# Patient Record
Sex: MALE | Race: WHITE | NOT HISPANIC OR LATINO | Employment: OTHER | URBAN - METROPOLITAN AREA
[De-identification: names, ages, dates, MRNs, and addresses within clinical notes are randomized per-mention and may not be internally consistent; named-entity substitution may affect disease eponyms.]

---

## 2018-01-01 ENCOUNTER — APPOINTMENT (INPATIENT)
Dept: RADIOLOGY | Facility: HOSPITAL | Age: 68
DRG: 871 | End: 2018-01-01
Payer: MEDICARE

## 2018-01-01 ENCOUNTER — APPOINTMENT (INPATIENT)
Dept: ULTRASOUND IMAGING | Facility: HOSPITAL | Age: 68
DRG: 871 | End: 2018-01-01
Payer: MEDICARE

## 2018-01-01 ENCOUNTER — HOSPITAL ENCOUNTER (INPATIENT)
Facility: HOSPITAL | Age: 68
LOS: 3 days | DRG: 871 | End: 2018-12-21
Attending: EMERGENCY MEDICINE | Admitting: INTERNAL MEDICINE
Payer: MEDICARE

## 2018-01-01 ENCOUNTER — APPOINTMENT (INPATIENT)
Dept: CT IMAGING | Facility: HOSPITAL | Age: 68
DRG: 871 | End: 2018-01-01
Payer: MEDICARE

## 2018-01-01 ENCOUNTER — APPOINTMENT (EMERGENCY)
Dept: RADIOLOGY | Facility: HOSPITAL | Age: 68
DRG: 871 | End: 2018-01-01
Payer: MEDICARE

## 2018-01-01 ENCOUNTER — APPOINTMENT (INPATIENT)
Dept: NON INVASIVE DIAGNOSTICS | Facility: HOSPITAL | Age: 68
DRG: 871 | End: 2018-01-01
Payer: MEDICARE

## 2018-01-01 VITALS
BODY MASS INDEX: 45.21 KG/M2 | RESPIRATION RATE: 20 BRPM | SYSTOLIC BLOOD PRESSURE: 91 MMHG | OXYGEN SATURATION: 94 % | TEMPERATURE: 99.3 F | HEIGHT: 68 IN | WEIGHT: 298.28 LBS | DIASTOLIC BLOOD PRESSURE: 57 MMHG | HEART RATE: 108 BPM

## 2018-01-01 DIAGNOSIS — K66.8 PNEUMOPERITONEUM: ICD-10-CM

## 2018-01-01 DIAGNOSIS — A41.9 SEVERE SEPSIS (HCC): ICD-10-CM

## 2018-01-01 DIAGNOSIS — I95.9 HYPOTENSION: ICD-10-CM

## 2018-01-01 DIAGNOSIS — E16.2 HYPOGLYCEMIA: ICD-10-CM

## 2018-01-01 DIAGNOSIS — I50.9 ACUTE CONGESTIVE HEART FAILURE, UNSPECIFIED HEART FAILURE TYPE (HCC): ICD-10-CM

## 2018-01-01 DIAGNOSIS — T68.XXXA HYPOTHERMIA, INITIAL ENCOUNTER: ICD-10-CM

## 2018-01-01 DIAGNOSIS — R57.9 SHOCK (HCC): ICD-10-CM

## 2018-01-01 DIAGNOSIS — J90 BILATERAL PLEURAL EFFUSION: ICD-10-CM

## 2018-01-01 DIAGNOSIS — A41.9 SEPTIC SHOCK (HCC): ICD-10-CM

## 2018-01-01 DIAGNOSIS — R65.21 SEPTIC SHOCK (HCC): ICD-10-CM

## 2018-01-01 DIAGNOSIS — L03.119 LOWER EXTREMITY CELLULITIS: ICD-10-CM

## 2018-01-01 DIAGNOSIS — I87.2 VENOUS INSUFFICIENCY OF BOTH LOWER EXTREMITIES: ICD-10-CM

## 2018-01-01 DIAGNOSIS — J96.01 ACUTE RESPIRATORY FAILURE WITH HYPOXIA AND HYPERCAPNIA (HCC): Primary | ICD-10-CM

## 2018-01-01 DIAGNOSIS — R65.20 SEVERE SEPSIS (HCC): ICD-10-CM

## 2018-01-01 DIAGNOSIS — E11.9 DIABETES MELLITUS (HCC): ICD-10-CM

## 2018-01-01 DIAGNOSIS — L89.90 PRESSURE ULCER: ICD-10-CM

## 2018-01-01 DIAGNOSIS — J96.02 ACUTE RESPIRATORY FAILURE WITH HYPOXIA AND HYPERCAPNIA (HCC): Primary | ICD-10-CM

## 2018-01-01 DIAGNOSIS — I48.91 NEW ONSET ATRIAL FIBRILLATION (HCC): ICD-10-CM

## 2018-01-01 LAB
ABO GROUP BLD: NORMAL
ADDITIONAL SETTING: 610
ALBUMIN SERPL BCP-MCNC: 1.9 G/DL (ref 3.5–5)
ALBUMIN SERPL BCP-MCNC: 2.3 G/DL (ref 3.5–5)
ALBUMIN SERPL BCP-MCNC: 2.7 G/DL (ref 3.5–5)
ALP SERPL-CCNC: 160 U/L (ref 46–116)
ALP SERPL-CCNC: 78 U/L (ref 46–116)
ALP SERPL-CCNC: 81 U/L (ref 46–116)
ALT SERPL W P-5'-P-CCNC: 22 U/L (ref 12–78)
ALT SERPL W P-5'-P-CCNC: 26 U/L (ref 12–78)
ALT SERPL W P-5'-P-CCNC: 48 U/L (ref 12–78)
AMORPH URATE CRY URNS QL MICRO: ABNORMAL /HPF
ANCILLARY VALUES: ABNORMAL
ANION GAP BLD CALC-SCNC: 13 MMOL/L (ref 4–13)
ANION GAP SERPL CALCULATED.3IONS-SCNC: 10 MMOL/L (ref 4–13)
ANION GAP SERPL CALCULATED.3IONS-SCNC: 11 MMOL/L (ref 4–13)
ANION GAP SERPL CALCULATED.3IONS-SCNC: 12 MMOL/L (ref 4–13)
ANION GAP SERPL CALCULATED.3IONS-SCNC: 9 MMOL/L (ref 4–13)
APTT PPP: 31 SECONDS (ref 26–38)
APTT PPP: 35 SECONDS (ref 26–38)
ARTERIAL PATENCY WRIST A: ABNORMAL
ARTERIAL PATENCY WRIST A: NO
ARTERIAL PATENCY WRIST A: NO
AST SERPL W P-5'-P-CCNC: 19 U/L (ref 5–45)
AST SERPL W P-5'-P-CCNC: 23 U/L (ref 5–45)
AST SERPL W P-5'-P-CCNC: 60 U/L (ref 5–45)
ATRIAL RATE: 150 BPM
ATRIAL RATE: 326 BPM
ATRIAL RATE: 92 BPM
BACTERIA UR QL AUTO: ABNORMAL /HPF
BASE EX.OXY STD BLDV CALC-SCNC: 73.6 % (ref 60–80)
BASE EX.OXY STD BLDV CALC-SCNC: 80.3 % (ref 60–80)
BASE EX.OXY STD BLDV CALC-SCNC: 81 % (ref 60–80)
BASE EXCESS BLDA CALC-SCNC: -1.8 MMOL/L
BASE EXCESS BLDA CALC-SCNC: -2.2 MMOL/L
BASE EXCESS BLDA CALC-SCNC: -2.7 MMOL/L
BASE EXCESS BLDA CALC-SCNC: -3.2 MMOL/L
BASE EXCESS BLDA CALC-SCNC: -4.5 MMOL/L
BASE EXCESS BLDA CALC-SCNC: -7.2 MMOL/L
BASE EXCESS BLDA CALC-SCNC: 0.6 MMOL/L
BASE EXCESS BLDA CALC-SCNC: 1 MMOL/L (ref -2–3)
BASE EXCESS BLDA CALC-SCNC: 4 MMOL/L (ref -2–3)
BASE EXCESS BLDV CALC-SCNC: -1.5 MMOL/L
BASE EXCESS BLDV CALC-SCNC: -8.2 MMOL/L
BASE EXCESS BLDV CALC-SCNC: 1.5 MMOL/L
BASOPHILS # BLD AUTO: 0.02 THOUSANDS/ΜL (ref 0–0.1)
BASOPHILS # BLD MANUAL: 0 THOUSAND/UL (ref 0–0.1)
BASOPHILS NFR BLD AUTO: 0 % (ref 0–1)
BASOPHILS NFR MAR MANUAL: 0 % (ref 0–1)
BILIRUB DIRECT SERPL-MCNC: 0.55 MG/DL (ref 0–0.2)
BILIRUB SERPL-MCNC: 0.8 MG/DL (ref 0.2–1)
BILIRUB SERPL-MCNC: 1.6 MG/DL (ref 0.2–1)
BILIRUB SERPL-MCNC: 2.5 MG/DL (ref 0.2–1)
BILIRUB UR QL STRIP: ABNORMAL
BLD GP AB SCN SERPL QL: NEGATIVE
BODY TEMPERATURE: 98.4 DEGREES FEHRENHEIT
BODY TEMPERATURE: 98.4 DEGREES FEHRENHEIT
BUN BLD-MCNC: 67 MG/DL (ref 5–25)
BUN SERPL-MCNC: 76 MG/DL (ref 5–25)
BUN SERPL-MCNC: 77 MG/DL (ref 5–25)
BUN SERPL-MCNC: 84 MG/DL (ref 5–25)
BUN SERPL-MCNC: 85 MG/DL (ref 5–25)
BUN SERPL-MCNC: 86 MG/DL (ref 5–25)
BUN SERPL-MCNC: 87 MG/DL (ref 5–25)
BUN SERPL-MCNC: 90 MG/DL (ref 5–25)
CA-I BLD-SCNC: 1.06 MMOL/L (ref 1.12–1.32)
CA-I BLD-SCNC: 1.07 MMOL/L (ref 1.12–1.32)
CA-I BLD-SCNC: 1.16 MMOL/L (ref 1.12–1.32)
CA-I BLD-SCNC: 1.22 MMOL/L (ref 1.12–1.32)
CALCIUM SERPL-MCNC: 8.4 MG/DL (ref 8.3–10.1)
CALCIUM SERPL-MCNC: 8.5 MG/DL (ref 8.3–10.1)
CALCIUM SERPL-MCNC: 8.5 MG/DL (ref 8.3–10.1)
CALCIUM SERPL-MCNC: 8.7 MG/DL (ref 8.3–10.1)
CALCIUM SERPL-MCNC: 8.9 MG/DL (ref 8.3–10.1)
CALCIUM SERPL-MCNC: 9 MG/DL (ref 8.3–10.1)
CALCIUM SERPL-MCNC: 9.2 MG/DL (ref 8.3–10.1)
CHLORIDE BLD-SCNC: 97 MMOL/L (ref 100–108)
CHLORIDE SERPL-SCNC: 100 MMOL/L (ref 100–108)
CHLORIDE SERPL-SCNC: 102 MMOL/L (ref 100–108)
CHLORIDE SERPL-SCNC: 104 MMOL/L (ref 100–108)
CHLORIDE SERPL-SCNC: 109 MMOL/L (ref 100–108)
CHLORIDE SERPL-SCNC: 110 MMOL/L (ref 100–108)
CLARITY UR: ABNORMAL
CO2 SERPL-SCNC: 22 MMOL/L (ref 21–32)
CO2 SERPL-SCNC: 22 MMOL/L (ref 21–32)
CO2 SERPL-SCNC: 24 MMOL/L (ref 21–32)
CO2 SERPL-SCNC: 25 MMOL/L (ref 21–32)
CO2 SERPL-SCNC: 26 MMOL/L (ref 21–32)
CO2 SERPL-SCNC: 26 MMOL/L (ref 21–32)
CO2 SERPL-SCNC: 30 MMOL/L (ref 21–32)
COLOR UR: YELLOW
CORTIS SERPL-MCNC: 51 UG/DL
CREAT BLD-MCNC: 1.2 MG/DL (ref 0.6–1.3)
CREAT SERPL-MCNC: 1.11 MG/DL (ref 0.6–1.3)
CREAT SERPL-MCNC: 1.2 MG/DL (ref 0.6–1.3)
CREAT SERPL-MCNC: 1.22 MG/DL (ref 0.6–1.3)
CREAT SERPL-MCNC: 1.26 MG/DL (ref 0.6–1.3)
CREAT SERPL-MCNC: 1.51 MG/DL (ref 0.6–1.3)
CREAT SERPL-MCNC: 1.57 MG/DL (ref 0.6–1.3)
CREAT SERPL-MCNC: 1.75 MG/DL (ref 0.6–1.3)
DS:DELIVERY SYSTEM: ABNORMAL
EOSINOPHIL # BLD AUTO: 0.06 THOUSAND/ΜL (ref 0–0.61)
EOSINOPHIL # BLD MANUAL: 0 THOUSAND/UL (ref 0–0.4)
EOSINOPHIL NFR BLD AUTO: 1 % (ref 0–6)
EOSINOPHIL NFR BLD MANUAL: 0 % (ref 0–6)
ERYTHROCYTE [DISTWIDTH] IN BLOOD BY AUTOMATED COUNT: 18.1 % (ref 11.6–15.1)
ERYTHROCYTE [DISTWIDTH] IN BLOOD BY AUTOMATED COUNT: 18.4 % (ref 11.6–15.1)
ERYTHROCYTE [DISTWIDTH] IN BLOOD BY AUTOMATED COUNT: 18.5 % (ref 11.6–15.1)
ERYTHROCYTE [DISTWIDTH] IN BLOOD BY AUTOMATED COUNT: 18.9 % (ref 11.6–15.1)
ERYTHROCYTE [DISTWIDTH] IN BLOOD BY AUTOMATED COUNT: 19.1 % (ref 11.6–15.1)
ERYTHROCYTE [DISTWIDTH] IN BLOOD BY AUTOMATED COUNT: 19.2 % (ref 11.6–15.1)
EST. AVERAGE GLUCOSE BLD GHB EST-MCNC: 103 MG/DL
FIO2 GAS DIL.REBREATH: 100 L
FLUAV AG SPEC QL IA: NEGATIVE
FLUAV AG SPEC QL: NORMAL
FLUBV AG SPEC QL IA: NEGATIVE
FLUBV AG SPEC QL: NORMAL
GFR SERPL CREATININE-BSD FRML MDRD: 39 ML/MIN/1.73SQ M
GFR SERPL CREATININE-BSD FRML MDRD: 45 ML/MIN/1.73SQ M
GFR SERPL CREATININE-BSD FRML MDRD: 47 ML/MIN/1.73SQ M
GFR SERPL CREATININE-BSD FRML MDRD: 59 ML/MIN/1.73SQ M
GFR SERPL CREATININE-BSD FRML MDRD: 61 ML/MIN/1.73SQ M
GFR SERPL CREATININE-BSD FRML MDRD: 62 ML/MIN/1.73SQ M
GFR SERPL CREATININE-BSD FRML MDRD: 62 ML/MIN/1.73SQ M
GFR SERPL CREATININE-BSD FRML MDRD: 68 ML/MIN/1.73SQ M
GLUCOSE SERPL-MCNC: 101 MG/DL (ref 65–140)
GLUCOSE SERPL-MCNC: 102 MG/DL (ref 65–140)
GLUCOSE SERPL-MCNC: 106 MG/DL (ref 65–140)
GLUCOSE SERPL-MCNC: 106 MG/DL (ref 65–140)
GLUCOSE SERPL-MCNC: 107 MG/DL (ref 65–140)
GLUCOSE SERPL-MCNC: 111 MG/DL (ref 65–140)
GLUCOSE SERPL-MCNC: 113 MG/DL (ref 65–140)
GLUCOSE SERPL-MCNC: 113 MG/DL (ref 65–140)
GLUCOSE SERPL-MCNC: 116 MG/DL (ref 65–140)
GLUCOSE SERPL-MCNC: 127 MG/DL (ref 65–140)
GLUCOSE SERPL-MCNC: 128 MG/DL (ref 65–140)
GLUCOSE SERPL-MCNC: 133 MG/DL (ref 65–140)
GLUCOSE SERPL-MCNC: 146 MG/DL (ref 65–140)
GLUCOSE SERPL-MCNC: 154 MG/DL (ref 65–140)
GLUCOSE SERPL-MCNC: 170 MG/DL (ref 65–140)
GLUCOSE SERPL-MCNC: 171 MG/DL (ref 65–140)
GLUCOSE SERPL-MCNC: 19 MG/DL (ref 65–140)
GLUCOSE SERPL-MCNC: 199 MG/DL (ref 65–140)
GLUCOSE SERPL-MCNC: 21 MG/DL (ref 65–140)
GLUCOSE SERPL-MCNC: 48 MG/DL (ref 65–140)
GLUCOSE SERPL-MCNC: 68 MG/DL (ref 65–140)
GLUCOSE SERPL-MCNC: 77 MG/DL (ref 65–140)
GLUCOSE SERPL-MCNC: 80 MG/DL (ref 65–140)
GLUCOSE SERPL-MCNC: 80 MG/DL (ref 65–140)
GLUCOSE SERPL-MCNC: 86 MG/DL (ref 65–140)
GLUCOSE SERPL-MCNC: 87 MG/DL (ref 65–140)
GLUCOSE SERPL-MCNC: 90 MG/DL (ref 65–140)
GLUCOSE SERPL-MCNC: 94 MG/DL (ref 65–140)
GLUCOSE SERPL-MCNC: 94 MG/DL (ref 65–140)
GLUCOSE SERPL-MCNC: 95 MG/DL (ref 65–140)
GLUCOSE SERPL-MCNC: 98 MG/DL (ref 65–140)
GLUCOSE SERPL-MCNC: 98 MG/DL (ref 65–140)
GLUCOSE SERPL-MCNC: <20 MG/DL (ref 65–140)
GLUCOSE SERPL-MCNC: <20 MG/DL (ref 65–140)
GLUCOSE UR STRIP-MCNC: NEGATIVE MG/DL
HBA1C MFR BLD: 5.2 % (ref 4.2–6.3)
HCO3 BLDA-SCNC: 16.4 MMOL/L (ref 22–28)
HCO3 BLDA-SCNC: 20.1 MMOL/L (ref 22–28)
HCO3 BLDA-SCNC: 22.1 MMOL/L (ref 22–28)
HCO3 BLDA-SCNC: 24.6 MMOL/L (ref 22–28)
HCO3 BLDA-SCNC: 25.1 MMOL/L (ref 22–28)
HCO3 BLDA-SCNC: 26 MMOL/L (ref 22–28)
HCO3 BLDA-SCNC: 27.8 MMOL/L (ref 22–28)
HCO3 BLDA-SCNC: 29.2 MMOL/L (ref 22–28)
HCO3 BLDA-SCNC: 33.2 MMOL/L (ref 24–30)
HCO3 BLDV-SCNC: 15.9 MMOL/L (ref 24–30)
HCO3 BLDV-SCNC: 24.8 MMOL/L (ref 24–30)
HCO3 BLDV-SCNC: 27.5 MMOL/L (ref 24–30)
HCT VFR BLD AUTO: 45.8 % (ref 36.5–49.3)
HCT VFR BLD AUTO: 46.5 % (ref 36.5–49.3)
HCT VFR BLD AUTO: 47 % (ref 36.5–49.3)
HCT VFR BLD AUTO: 48.1 % (ref 36.5–49.3)
HCT VFR BLD AUTO: 50.4 % (ref 36.5–49.3)
HCT VFR BLD AUTO: 51.7 % (ref 36.5–49.3)
HCT VFR BLD CALC: 51 % (ref 36.5–49.3)
HCT VFR BLD CALC: 54 % (ref 36.5–49.3)
HCT VFR BLD CALC: 54 % (ref 36.5–49.3)
HGB BLD-MCNC: 15.9 G/DL (ref 12–17)
HGB BLD-MCNC: 16.4 G/DL (ref 12–17)
HGB BLD-MCNC: 16.4 G/DL (ref 12–17)
HGB BLD-MCNC: 17.3 G/DL (ref 12–17)
HGB BLD-MCNC: 18.1 G/DL (ref 12–17)
HGB BLD-MCNC: 18.3 G/DL (ref 12–17)
HGB BLDA-MCNC: 17.3 G/DL (ref 12–17)
HGB BLDA-MCNC: 18.4 G/DL (ref 12–17)
HGB BLDA-MCNC: 18.4 G/DL (ref 12–17)
HGB UR QL STRIP.AUTO: ABNORMAL
HOROWITZ INDEX BLDA+IHG-RTO: 50 MM[HG]
HYALINE CASTS #/AREA URNS LPF: ABNORMAL /LPF
IMM GRANULOCYTES # BLD AUTO: 0.01 THOUSAND/UL (ref 0–0.2)
IMM GRANULOCYTES NFR BLD AUTO: 0 % (ref 0–2)
INR PPP: 1.16 (ref 0.86–1.17)
INR PPP: 1.64 (ref 0.86–1.17)
IPAP: 14
IPAP: 14
KETONES UR STRIP-MCNC: ABNORMAL MG/DL
L PNEUMO1 AG UR QL IA.RAPID: NEGATIVE
LACTATE SERPL-SCNC: 1.2 MMOL/L (ref 0.5–2)
LACTATE SERPL-SCNC: 1.5 MMOL/L (ref 0.5–2)
LACTATE SERPL-SCNC: 1.7 MMOL/L (ref 0.5–2)
LEUKOCYTE ESTERASE UR QL STRIP: NEGATIVE
LIPASE SERPL-CCNC: 270 U/L (ref 73–393)
LIPASE SERPL-CCNC: 494 U/L (ref 73–393)
LYMPHOCYTES # BLD AUTO: 0.17 THOUSAND/UL (ref 0.6–4.47)
LYMPHOCYTES # BLD AUTO: 0.2 THOUSAND/UL (ref 0.6–4.47)
LYMPHOCYTES # BLD AUTO: 0.31 THOUSAND/UL (ref 0.6–4.47)
LYMPHOCYTES # BLD AUTO: 0.35 THOUSAND/UL (ref 0.6–4.47)
LYMPHOCYTES # BLD AUTO: 0.52 THOUSANDS/ΜL (ref 0.6–4.47)
LYMPHOCYTES # BLD AUTO: 2 % (ref 14–44)
LYMPHOCYTES # BLD AUTO: 2 % (ref 14–44)
LYMPHOCYTES # BLD AUTO: 3 % (ref 14–44)
LYMPHOCYTES # BLD AUTO: 3 % (ref 14–44)
LYMPHOCYTES NFR BLD AUTO: 11 % (ref 14–44)
MAGNESIUM SERPL-MCNC: 2.2 MG/DL (ref 1.6–2.6)
MAGNESIUM SERPL-MCNC: 2.3 MG/DL (ref 1.6–2.6)
MAGNESIUM SERPL-MCNC: 2.3 MG/DL (ref 1.6–2.6)
MAGNESIUM SERPL-MCNC: 2.5 MG/DL (ref 1.6–2.6)
MCH RBC QN AUTO: 31.8 PG (ref 26.8–34.3)
MCH RBC QN AUTO: 32 PG (ref 26.8–34.3)
MCH RBC QN AUTO: 32.1 PG (ref 26.8–34.3)
MCH RBC QN AUTO: 32.2 PG (ref 26.8–34.3)
MCH RBC QN AUTO: 32.5 PG (ref 26.8–34.3)
MCH RBC QN AUTO: 32.8 PG (ref 26.8–34.3)
MCHC RBC AUTO-ENTMCNC: 34.7 G/DL (ref 31.4–37.4)
MCHC RBC AUTO-ENTMCNC: 34.9 G/DL (ref 31.4–37.4)
MCHC RBC AUTO-ENTMCNC: 35.3 G/DL (ref 31.4–37.4)
MCHC RBC AUTO-ENTMCNC: 35.4 G/DL (ref 31.4–37.4)
MCHC RBC AUTO-ENTMCNC: 35.9 G/DL (ref 31.4–37.4)
MCHC RBC AUTO-ENTMCNC: 36 G/DL (ref 31.4–37.4)
MCV RBC AUTO: 90 FL (ref 82–98)
MCV RBC AUTO: 91 FL (ref 82–98)
MCV RBC AUTO: 92 FL (ref 82–98)
MCV RBC AUTO: 93 FL (ref 82–98)
MONOCYTES # BLD AUTO: 0.21 THOUSAND/UL (ref 0–1.22)
MONOCYTES # BLD AUTO: 0.25 THOUSAND/UL (ref 0–1.22)
MONOCYTES # BLD AUTO: 0.3 THOUSAND/UL (ref 0–1.22)
MONOCYTES # BLD AUTO: 0.31 THOUSAND/ΜL (ref 0.17–1.22)
MONOCYTES # BLD AUTO: 0.47 THOUSAND/UL (ref 0–1.22)
MONOCYTES NFR BLD AUTO: 7 % (ref 4–12)
MONOCYTES NFR BLD: 2 % (ref 4–12)
MONOCYTES NFR BLD: 3 % (ref 4–12)
MONOCYTES NFR BLD: 3 % (ref 4–12)
MONOCYTES NFR BLD: 4 % (ref 4–12)
MRSA NOSE QL CULT: NORMAL
NEUTROPHILS # BLD AUTO: 3.76 THOUSANDS/ΜL (ref 1.85–7.62)
NEUTROPHILS # BLD MANUAL: 10.82 THOUSAND/UL (ref 1.85–7.62)
NEUTROPHILS # BLD MANUAL: 7.99 THOUSAND/UL (ref 1.85–7.62)
NEUTROPHILS # BLD MANUAL: 9.47 THOUSAND/UL (ref 1.85–7.62)
NEUTROPHILS # BLD MANUAL: 9.79 THOUSAND/UL (ref 1.85–7.62)
NEUTS BAND NFR BLD MANUAL: 10 % (ref 0–8)
NEUTS BAND NFR BLD MANUAL: 11 % (ref 0–8)
NEUTS BAND NFR BLD MANUAL: 8 % (ref 0–8)
NEUTS BAND NFR BLD MANUAL: 8 % (ref 0–8)
NEUTS SEG NFR BLD AUTO: 81 % (ref 43–75)
NEUTS SEG NFR BLD AUTO: 82 % (ref 43–75)
NEUTS SEG NFR BLD AUTO: 85 % (ref 43–75)
NEUTS SEG NFR BLD AUTO: 87 % (ref 43–75)
NEUTS SEG NFR BLD AUTO: 87 % (ref 43–75)
NITRITE UR QL STRIP: NEGATIVE
NON VENT- BIPAP: ABNORMAL
NON VENT- BIPAP: ABNORMAL
NON-SQ EPI CELLS URNS QL MICRO: ABNORMAL /HPF
NRBC BLD AUTO-RTO: 0 /100 WBCS
NRBC BLD AUTO-RTO: 1 /100 WBC (ref 0–2)
NT-PROBNP SERPL-MCNC: 8391 PG/ML
O2 CT BLDA-SCNC: 16.7 ML/DL (ref 16–23)
O2 CT BLDA-SCNC: 20.1 ML/DL (ref 16–23)
O2 CT BLDA-SCNC: 20.5 ML/DL (ref 16–23)
O2 CT BLDA-SCNC: 20.6 ML/DL (ref 16–23)
O2 CT BLDA-SCNC: 21 ML/DL (ref 16–23)
O2 CT BLDA-SCNC: 21.4 ML/DL (ref 16–23)
O2 CT BLDA-SCNC: 22.1 ML/DL (ref 16–23)
O2 CT BLDV-SCNC: 17.5 ML/DL
O2 CT BLDV-SCNC: 17.7 ML/DL
O2 CT BLDV-SCNC: 18.3 ML/DL
OXYHGB MFR BLDA: 89 % (ref 94–97)
OXYHGB MFR BLDA: 89.5 % (ref 94–97)
OXYHGB MFR BLDA: 89.9 % (ref 94–97)
OXYHGB MFR BLDA: 90 % (ref 94–97)
OXYHGB MFR BLDA: 90.1 % (ref 94–97)
OXYHGB MFR BLDA: 91.4 % (ref 94–97)
OXYHGB MFR BLDA: 92 % (ref 94–97)
P AXIS: 249 DEGREES
PCO2 BLD: 31 MMOL/L (ref 21–32)
PCO2 BLD: 32 MMOL/L (ref 21–32)
PCO2 BLD: 35 MMOL/L (ref 21–32)
PCO2 BLD: 57.2 MM HG (ref 36–44)
PCO2 BLD: 66.3 MM HG (ref 42–50)
PCO2 BLDA: 28.3 MM HG (ref 36–44)
PCO2 BLDA: 32.2 MM HG (ref 36–44)
PCO2 BLDA: 46.3 MM HG (ref 36–44)
PCO2 BLDA: 49.9 MM HG (ref 36–44)
PCO2 BLDA: 50.6 MM HG (ref 36–44)
PCO2 BLDA: 54 MM HG (ref 36–44)
PCO2 BLDA: 60.6 MM HG (ref 36–44)
PCO2 BLDV: 29.7 MM HG (ref 42–50)
PCO2 BLDV: 47.2 MM HG (ref 42–50)
PCO2 BLDV: 47.8 MM HG (ref 42–50)
PEEP MAX SETTING VENT: 8 CM[H2O]
PEEP MAX SETTING VENT: 8 CM[H2O]
PEEP RESPIRATORY: 8 CM[H2O]
PH BLD: 7.31 [PH] (ref 7.3–7.4)
PH BLD: 7.32 [PH] (ref 7.35–7.45)
PH BLDA: 7.25 [PH] (ref 7.35–7.45)
PH BLDA: 7.3 [PH] (ref 7.35–7.45)
PH BLDA: 7.31 [PH] (ref 7.35–7.45)
PH BLDA: 7.31 [PH] (ref 7.35–7.45)
PH BLDA: 7.33 [PH] (ref 7.35–7.45)
PH BLDA: 7.38 [PH] (ref 7.35–7.45)
PH BLDA: 7.41 [PH] (ref 7.35–7.45)
PH BLDV: 7.34 [PH] (ref 7.3–7.4)
PH BLDV: 7.35 [PH] (ref 7.3–7.4)
PH BLDV: 7.38 [PH] (ref 7.3–7.4)
PH UR STRIP.AUTO: 5 [PH] (ref 4.5–8)
PHOSPHATE SERPL-MCNC: 3.7 MG/DL (ref 2.3–4.1)
PHOSPHATE SERPL-MCNC: 4.5 MG/DL (ref 2.3–4.1)
PHOSPHATE SERPL-MCNC: 4.7 MG/DL (ref 2.3–4.1)
PLATELET # BLD AUTO: 186 THOUSANDS/UL (ref 149–390)
PLATELET # BLD AUTO: 187 THOUSANDS/UL (ref 149–390)
PLATELET # BLD AUTO: 192 THOUSANDS/UL (ref 149–390)
PLATELET # BLD AUTO: 211 THOUSANDS/UL (ref 149–390)
PLATELET # BLD AUTO: 222 THOUSANDS/UL (ref 149–390)
PLATELET # BLD AUTO: 290 THOUSANDS/UL (ref 149–390)
PLATELET BLD QL SMEAR: ADEQUATE
PMV BLD AUTO: 10.3 FL (ref 8.9–12.7)
PMV BLD AUTO: 10.5 FL (ref 8.9–12.7)
PMV BLD AUTO: 10.6 FL (ref 8.9–12.7)
PMV BLD AUTO: 10.7 FL (ref 8.9–12.7)
PMV BLD AUTO: 10.8 FL (ref 8.9–12.7)
PMV BLD AUTO: 12.4 FL (ref 8.9–12.7)
PO2 BLD: 39 MM HG (ref 35–45)
PO2 BLD: 62 MM HG (ref 75–129)
PO2 BLDA: 61.8 MM HG (ref 75–129)
PO2 BLDA: 63.3 MM HG (ref 75–129)
PO2 BLDA: 64.5 MM HG (ref 75–129)
PO2 BLDA: 64.9 MM HG (ref 75–129)
PO2 BLDA: 65.8 MM HG (ref 75–129)
PO2 BLDA: 67.2 MM HG (ref 75–129)
PO2 BLDA: 70.1 MM HG (ref 75–129)
PO2 BLDV: 41.4 MM HG (ref 35–45)
PO2 BLDV: 45.8 MM HG (ref 35–45)
PO2 BLDV: 48.9 MM HG (ref 35–45)
POTASSIUM BLD-SCNC: 3.9 MMOL/L (ref 3.5–5.3)
POTASSIUM BLD-SCNC: 4 MMOL/L (ref 3.5–5.3)
POTASSIUM BLD-SCNC: 5.1 MMOL/L (ref 3.5–5.3)
POTASSIUM SERPL-SCNC: 4 MMOL/L (ref 3.5–5.3)
POTASSIUM SERPL-SCNC: 4.3 MMOL/L (ref 3.5–5.3)
POTASSIUM SERPL-SCNC: 4.4 MMOL/L (ref 3.5–5.3)
POTASSIUM SERPL-SCNC: 4.4 MMOL/L (ref 3.5–5.3)
POTASSIUM SERPL-SCNC: 4.7 MMOL/L (ref 3.5–5.3)
POTASSIUM SERPL-SCNC: 4.7 MMOL/L (ref 3.5–5.3)
POTASSIUM SERPL-SCNC: 5.1 MMOL/L (ref 3.5–5.3)
PR INTERVAL: 154 MS
PRESSURE SETTING: 1
PROCALCITONIN SERPL-MCNC: 0.5 NG/ML
PROCALCITONIN SERPL-MCNC: 1.85 NG/ML
PROCALCITONIN SERPL-MCNC: 3.61 NG/ML
PROCALCITONIN SERPL-MCNC: 4.33 NG/ML
PROT SERPL-MCNC: 5.6 G/DL (ref 6.4–8.2)
PROT SERPL-MCNC: 5.9 G/DL (ref 6.4–8.2)
PROT SERPL-MCNC: 6.3 G/DL (ref 6.4–8.2)
PROT UR STRIP-MCNC: ABNORMAL MG/DL
PROTHROMBIN TIME: 14.5 SECONDS (ref 11.8–14.2)
PROTHROMBIN TIME: 18.9 SECONDS (ref 11.8–14.2)
QRS AXIS: 0 DEGREES
QRS AXIS: 221 DEGREES
QRS AXIS: 246 DEGREES
QRSD INTERVAL: 112 MS
QRSD INTERVAL: 112 MS
QRSD INTERVAL: 4 MS
QT INTERVAL: 254 MS
QT INTERVAL: 380 MS
QT INTERVAL: 418 MS
QTC INTERVAL: 314 MS
QTC INTERVAL: 407 MS
QTC INTERVAL: 457 MS
RBC # BLD AUTO: 4.94 MILLION/UL (ref 3.88–5.62)
RBC # BLD AUTO: 5.12 MILLION/UL (ref 3.88–5.62)
RBC # BLD AUTO: 5.15 MILLION/UL (ref 3.88–5.62)
RBC # BLD AUTO: 5.28 MILLION/UL (ref 3.88–5.62)
RBC # BLD AUTO: 5.57 MILLION/UL (ref 3.88–5.62)
RBC # BLD AUTO: 5.7 MILLION/UL (ref 3.88–5.62)
RBC #/AREA URNS AUTO: ABNORMAL /HPF
RESPIRATORY RATE: 8
RH BLD: POSITIVE
RSV B RNA SPEC QL NAA+PROBE: NORMAL
S PNEUM AG UR QL: NEGATIVE
SAMPLE SITE: ABNORMAL
SAO2 % BLD FROM PO2: 66 % (ref 95–98)
SAO2 % BLD FROM PO2: 88 % (ref 95–98)
SODIUM BLD-SCNC: 138 MMOL/L (ref 136–145)
SODIUM BLD-SCNC: 139 MMOL/L (ref 136–145)
SODIUM BLD-SCNC: 140 MMOL/L (ref 136–145)
SODIUM SERPL-SCNC: 138 MMOL/L (ref 136–145)
SODIUM SERPL-SCNC: 139 MMOL/L (ref 136–145)
SODIUM SERPL-SCNC: 140 MMOL/L (ref 136–145)
SODIUM SERPL-SCNC: 140 MMOL/L (ref 136–145)
SODIUM SERPL-SCNC: 141 MMOL/L (ref 136–145)
SODIUM SERPL-SCNC: 142 MMOL/L (ref 136–145)
SODIUM SERPL-SCNC: 143 MMOL/L (ref 136–145)
SP GR UR STRIP.AUTO: >=1.03 (ref 1–1.03)
SPECIMEN EXPIRATION DATE: NORMAL
SPECIMEN SOURCE: ABNORMAL
T WAVE AXIS: 118 DEGREES
T WAVE AXIS: 170 DEGREES
T WAVE AXIS: 260 DEGREES
TARGETS BLD QL SMEAR: PRESENT
TOTAL CELLS COUNTED SPEC: 100
TROPONIN I SERPL-MCNC: 0.04 NG/ML
TROPONIN I SERPL-MCNC: 0.05 NG/ML
TSH SERPL DL<=0.05 MIU/L-ACNC: 0.87 UIU/ML (ref 0.36–3.74)
TSH SERPL DL<=0.05 MIU/L-ACNC: 3.91 UIU/ML (ref 0.36–3.74)
UROBILINOGEN UR QL STRIP.AUTO: 0.2 E.U./DL
VANCOMYCIN TROUGH SERPL-MCNC: 21 UG/ML (ref 10–20)
VENT AC: 22
VENT BIPAP FIO2: 60 %
VENT BIPAP FIO2: 80 %
VENT- AC: AC
VENTILATION VALUE: 0
VENTRICULAR RATE: 69 BPM
VENTRICULAR RATE: 72 BPM
VENTRICULAR RATE: 92 BPM
VT SETTING VENT: 450 ML
WBC # BLD AUTO: 10.3 THOUSAND/UL (ref 4.31–10.16)
WBC # BLD AUTO: 10.34 THOUSAND/UL (ref 4.31–10.16)
WBC # BLD AUTO: 11.63 THOUSAND/UL (ref 4.31–10.16)
WBC # BLD AUTO: 4.68 THOUSAND/UL (ref 4.31–10.16)
WBC # BLD AUTO: 8.41 THOUSAND/UL (ref 4.31–10.16)
WBC # BLD AUTO: 9.97 THOUSAND/UL (ref 4.31–10.16)
WBC #/AREA URNS AUTO: ABNORMAL /HPF

## 2018-01-01 PROCEDURE — 71045 X-RAY EXAM CHEST 1 VIEW: CPT

## 2018-01-01 PROCEDURE — 83735 ASSAY OF MAGNESIUM: CPT | Performed by: NURSE PRACTITIONER

## 2018-01-01 PROCEDURE — 82948 REAGENT STRIP/BLOOD GLUCOSE: CPT

## 2018-01-01 PROCEDURE — 96374 THER/PROPH/DIAG INJ IV PUSH: CPT

## 2018-01-01 PROCEDURE — 84484 ASSAY OF TROPONIN QUANT: CPT | Performed by: EMERGENCY MEDICINE

## 2018-01-01 PROCEDURE — 84100 ASSAY OF PHOSPHORUS: CPT | Performed by: PHYSICIAN ASSISTANT

## 2018-01-01 PROCEDURE — 0BC58ZZ EXTIRPATION OF MATTER FROM RIGHT MIDDLE LOBE BRONCHUS, VIA NATURAL OR ARTIFICIAL OPENING ENDOSCOPIC: ICD-10-PCS | Performed by: INTERNAL MEDICINE

## 2018-01-01 PROCEDURE — 82533 TOTAL CORTISOL: CPT | Performed by: PHYSICIAN ASSISTANT

## 2018-01-01 PROCEDURE — 88112 CYTOPATH CELL ENHANCE TECH: CPT | Performed by: PATHOLOGY

## 2018-01-01 PROCEDURE — 99291 CRITICAL CARE FIRST HOUR: CPT | Performed by: INTERNAL MEDICINE

## 2018-01-01 PROCEDURE — 94660 CPAP INITIATION&MGMT: CPT

## 2018-01-01 PROCEDURE — 94760 N-INVAS EAR/PLS OXIMETRY 1: CPT

## 2018-01-01 PROCEDURE — 80202 ASSAY OF VANCOMYCIN: CPT | Performed by: NURSE PRACTITIONER

## 2018-01-01 PROCEDURE — 36556 INSERT NON-TUNNEL CV CATH: CPT | Performed by: INTERNAL MEDICINE

## 2018-01-01 PROCEDURE — 83605 ASSAY OF LACTIC ACID: CPT | Performed by: NURSE PRACTITIONER

## 2018-01-01 PROCEDURE — 87631 RESP VIRUS 3-5 TARGETS: CPT | Performed by: NURSE PRACTITIONER

## 2018-01-01 PROCEDURE — 80048 BASIC METABOLIC PNL TOTAL CA: CPT | Performed by: NURSE PRACTITIONER

## 2018-01-01 PROCEDURE — 36600 WITHDRAWAL OF ARTERIAL BLOOD: CPT

## 2018-01-01 PROCEDURE — 85027 COMPLETE CBC AUTOMATED: CPT | Performed by: PHYSICIAN ASSISTANT

## 2018-01-01 PROCEDURE — 99231 SBSQ HOSP IP/OBS SF/LOW 25: CPT | Performed by: INTERNAL MEDICINE

## 2018-01-01 PROCEDURE — 94003 VENT MGMT INPAT SUBQ DAY: CPT

## 2018-01-01 PROCEDURE — 84100 ASSAY OF PHOSPHORUS: CPT | Performed by: NURSE PRACTITIONER

## 2018-01-01 PROCEDURE — 99233 SBSQ HOSP IP/OBS HIGH 50: CPT | Performed by: INTERNAL MEDICINE

## 2018-01-01 PROCEDURE — 82803 BLOOD GASES ANY COMBINATION: CPT

## 2018-01-01 PROCEDURE — 81001 URINALYSIS AUTO W/SCOPE: CPT | Performed by: EMERGENCY MEDICINE

## 2018-01-01 PROCEDURE — 83735 ASSAY OF MAGNESIUM: CPT | Performed by: PHYSICIAN ASSISTANT

## 2018-01-01 PROCEDURE — 87102 FUNGUS ISOLATION CULTURE: CPT | Performed by: INTERNAL MEDICINE

## 2018-01-01 PROCEDURE — 84145 PROCALCITONIN (PCT): CPT | Performed by: NURSE PRACTITIONER

## 2018-01-01 PROCEDURE — 82330 ASSAY OF CALCIUM: CPT

## 2018-01-01 PROCEDURE — 96360 HYDRATION IV INFUSION INIT: CPT

## 2018-01-01 PROCEDURE — 82805 BLOOD GASES W/O2 SATURATION: CPT | Performed by: NURSE PRACTITIONER

## 2018-01-01 PROCEDURE — 84484 ASSAY OF TROPONIN QUANT: CPT | Performed by: NURSE PRACTITIONER

## 2018-01-01 PROCEDURE — 87040 BLOOD CULTURE FOR BACTERIA: CPT | Performed by: EMERGENCY MEDICINE

## 2018-01-01 PROCEDURE — 82947 ASSAY GLUCOSE BLOOD QUANT: CPT

## 2018-01-01 PROCEDURE — 99222 1ST HOSP IP/OBS MODERATE 55: CPT | Performed by: INTERNAL MEDICINE

## 2018-01-01 PROCEDURE — 0BC88ZZ EXTIRPATION OF MATTER FROM LEFT UPPER LOBE BRONCHUS, VIA NATURAL OR ARTIFICIAL OPENING ENDOSCOPIC: ICD-10-PCS | Performed by: INTERNAL MEDICINE

## 2018-01-01 PROCEDURE — 87040 BLOOD CULTURE FOR BACTERIA: CPT | Performed by: NURSE PRACTITIONER

## 2018-01-01 PROCEDURE — 82330 ASSAY OF CALCIUM: CPT | Performed by: NURSE PRACTITIONER

## 2018-01-01 PROCEDURE — 31500 INSERT EMERGENCY AIRWAY: CPT | Performed by: NURSE PRACTITIONER

## 2018-01-01 PROCEDURE — 94002 VENT MGMT INPAT INIT DAY: CPT

## 2018-01-01 PROCEDURE — 83605 ASSAY OF LACTIC ACID: CPT | Performed by: EMERGENCY MEDICINE

## 2018-01-01 PROCEDURE — 83690 ASSAY OF LIPASE: CPT | Performed by: NURSE PRACTITIONER

## 2018-01-01 PROCEDURE — 94640 AIRWAY INHALATION TREATMENT: CPT

## 2018-01-01 PROCEDURE — 93005 ELECTROCARDIOGRAM TRACING: CPT

## 2018-01-01 PROCEDURE — 93010 ELECTROCARDIOGRAM REPORT: CPT | Performed by: INTERNAL MEDICINE

## 2018-01-01 PROCEDURE — 99292 CRITICAL CARE ADDL 30 MIN: CPT | Performed by: INTERNAL MEDICINE

## 2018-01-01 PROCEDURE — 0BH17EZ INSERTION OF ENDOTRACHEAL AIRWAY INTO TRACHEA, VIA NATURAL OR ARTIFICIAL OPENING: ICD-10-PCS | Performed by: INTERNAL MEDICINE

## 2018-01-01 PROCEDURE — 86901 BLOOD TYPING SEROLOGIC RH(D): CPT | Performed by: NURSE PRACTITIONER

## 2018-01-01 PROCEDURE — 87449 NOS EACH ORGANISM AG IA: CPT | Performed by: NURSE PRACTITIONER

## 2018-01-01 PROCEDURE — 85610 PROTHROMBIN TIME: CPT | Performed by: NURSE PRACTITIONER

## 2018-01-01 PROCEDURE — 86900 BLOOD TYPING SEROLOGIC ABO: CPT | Performed by: NURSE PRACTITIONER

## 2018-01-01 PROCEDURE — 84443 ASSAY THYROID STIM HORMONE: CPT | Performed by: NURSE PRACTITIONER

## 2018-01-01 PROCEDURE — 80053 COMPREHEN METABOLIC PANEL: CPT | Performed by: PHYSICIAN ASSISTANT

## 2018-01-01 PROCEDURE — 71250 CT THORAX DX C-: CPT

## 2018-01-01 PROCEDURE — 84132 ASSAY OF SERUM POTASSIUM: CPT

## 2018-01-01 PROCEDURE — 80047 BASIC METABLC PNL IONIZED CA: CPT

## 2018-01-01 PROCEDURE — 02HV33Z INSERTION OF INFUSION DEVICE INTO SUPERIOR VENA CAVA, PERCUTANEOUS APPROACH: ICD-10-PCS | Performed by: INTERNAL MEDICINE

## 2018-01-01 PROCEDURE — 84295 ASSAY OF SERUM SODIUM: CPT

## 2018-01-01 PROCEDURE — 85027 COMPLETE CBC AUTOMATED: CPT | Performed by: NURSE PRACTITIONER

## 2018-01-01 PROCEDURE — 88305 TISSUE EXAM BY PATHOLOGIST: CPT | Performed by: PATHOLOGY

## 2018-01-01 PROCEDURE — 84145 PROCALCITONIN (PCT): CPT | Performed by: PHYSICIAN ASSISTANT

## 2018-01-01 PROCEDURE — 94762 N-INVAS EAR/PLS OXIMTRY CONT: CPT

## 2018-01-01 PROCEDURE — 80048 BASIC METABOLIC PNL TOTAL CA: CPT | Performed by: EMERGENCY MEDICINE

## 2018-01-01 PROCEDURE — 83036 HEMOGLOBIN GLYCOSYLATED A1C: CPT | Performed by: NURSE PRACTITIONER

## 2018-01-01 PROCEDURE — 85610 PROTHROMBIN TIME: CPT | Performed by: EMERGENCY MEDICINE

## 2018-01-01 PROCEDURE — 80048 BASIC METABOLIC PNL TOTAL CA: CPT | Performed by: PHYSICIAN ASSISTANT

## 2018-01-01 PROCEDURE — 87070 CULTURE OTHR SPECIMN AEROBIC: CPT | Performed by: INTERNAL MEDICINE

## 2018-01-01 PROCEDURE — 87106 FUNGI IDENTIFICATION YEAST: CPT | Performed by: INTERNAL MEDICINE

## 2018-01-01 PROCEDURE — 85730 THROMBOPLASTIN TIME PARTIAL: CPT | Performed by: NURSE PRACTITIONER

## 2018-01-01 PROCEDURE — 93970 EXTREMITY STUDY: CPT

## 2018-01-01 PROCEDURE — 82805 BLOOD GASES W/O2 SATURATION: CPT | Performed by: PHYSICIAN ASSISTANT

## 2018-01-01 PROCEDURE — 31622 DX BRONCHOSCOPE/WASH: CPT | Performed by: INTERNAL MEDICINE

## 2018-01-01 PROCEDURE — 99238 HOSP IP/OBS DSCHRG MGMT 30/<: CPT | Performed by: INTERNAL MEDICINE

## 2018-01-01 PROCEDURE — 85007 BL SMEAR W/DIFF WBC COUNT: CPT | Performed by: NURSE PRACTITIONER

## 2018-01-01 PROCEDURE — 83690 ASSAY OF LIPASE: CPT | Performed by: EMERGENCY MEDICINE

## 2018-01-01 PROCEDURE — 94150 VITAL CAPACITY TEST: CPT

## 2018-01-01 PROCEDURE — 96361 HYDRATE IV INFUSION ADD-ON: CPT

## 2018-01-01 PROCEDURE — 87081 CULTURE SCREEN ONLY: CPT | Performed by: NURSE PRACTITIONER

## 2018-01-01 PROCEDURE — 85007 BL SMEAR W/DIFF WBC COUNT: CPT | Performed by: PHYSICIAN ASSISTANT

## 2018-01-01 PROCEDURE — 87281 PNEUMOCYSTIS CARINII AG IF: CPT | Performed by: INTERNAL MEDICINE

## 2018-01-01 PROCEDURE — 80053 COMPREHEN METABOLIC PANEL: CPT | Performed by: NURSE PRACTITIONER

## 2018-01-01 PROCEDURE — 36415 COLL VENOUS BLD VENIPUNCTURE: CPT | Performed by: EMERGENCY MEDICINE

## 2018-01-01 PROCEDURE — 85025 COMPLETE CBC W/AUTO DIFF WBC: CPT | Performed by: EMERGENCY MEDICINE

## 2018-01-01 PROCEDURE — 36620 INSERTION CATHETER ARTERY: CPT

## 2018-01-01 PROCEDURE — 5A1945Z RESPIRATORY VENTILATION, 24-96 CONSECUTIVE HOURS: ICD-10-PCS | Performed by: INTERNAL MEDICINE

## 2018-01-01 PROCEDURE — 99232 SBSQ HOSP IP/OBS MODERATE 35: CPT | Performed by: INTERNAL MEDICINE

## 2018-01-01 PROCEDURE — 86850 RBC ANTIBODY SCREEN: CPT | Performed by: NURSE PRACTITIONER

## 2018-01-01 PROCEDURE — 96375 TX/PRO/DX INJ NEW DRUG ADDON: CPT

## 2018-01-01 PROCEDURE — 85014 HEMATOCRIT: CPT

## 2018-01-01 PROCEDURE — 99285 EMERGENCY DEPT VISIT HI MDM: CPT

## 2018-01-01 PROCEDURE — 74176 CT ABD & PELVIS W/O CONTRAST: CPT

## 2018-01-01 PROCEDURE — 87252 VIRUS INOCULATION TISSUE: CPT | Performed by: INTERNAL MEDICINE

## 2018-01-01 PROCEDURE — 83880 ASSAY OF NATRIURETIC PEPTIDE: CPT | Performed by: EMERGENCY MEDICINE

## 2018-01-01 PROCEDURE — 93306 TTE W/DOPPLER COMPLETE: CPT

## 2018-01-01 PROCEDURE — 85730 THROMBOPLASTIN TIME PARTIAL: CPT | Performed by: EMERGENCY MEDICINE

## 2018-01-01 PROCEDURE — 87015 SPECIMEN INFECT AGNT CONCNTJ: CPT | Performed by: INTERNAL MEDICINE

## 2018-01-01 PROCEDURE — 80076 HEPATIC FUNCTION PANEL: CPT | Performed by: EMERGENCY MEDICINE

## 2018-01-01 PROCEDURE — 93306 TTE W/DOPPLER COMPLETE: CPT | Performed by: INTERNAL MEDICINE

## 2018-01-01 RX ORDER — HEPARIN SODIUM 1000 [USP'U]/ML
4000 INJECTION, SOLUTION INTRAVENOUS; SUBCUTANEOUS ONCE
Status: COMPLETED | OUTPATIENT
Start: 2018-01-01 | End: 2018-01-01

## 2018-01-01 RX ORDER — LIDOCAINE HYDROCHLORIDE 10 MG/ML
INJECTION, SOLUTION EPIDURAL; INFILTRATION; INTRACAUDAL; PERINEURAL
Status: COMPLETED
Start: 2018-01-01 | End: 2018-01-01

## 2018-01-01 RX ORDER — FUROSEMIDE 10 MG/ML
80 INJECTION INTRAMUSCULAR; INTRAVENOUS ONCE
Status: DISCONTINUED | OUTPATIENT
Start: 2018-01-01 | End: 2018-01-01

## 2018-01-01 RX ORDER — ALBUMIN, HUMAN INJ 5% 5 %
12.5 SOLUTION INTRAVENOUS ONCE
Status: COMPLETED | OUTPATIENT
Start: 2018-01-01 | End: 2018-01-01

## 2018-01-01 RX ORDER — DEXTROSE MONOHYDRATE 25 G/50ML
25 INJECTION, SOLUTION INTRAVENOUS ONCE
Status: DISCONTINUED | OUTPATIENT
Start: 2018-01-01 | End: 2018-01-01

## 2018-01-01 RX ORDER — HEPARIN SODIUM 10000 [USP'U]/100ML
3-20 INJECTION, SOLUTION INTRAVENOUS
Status: DISCONTINUED | OUTPATIENT
Start: 2018-01-01 | End: 2018-01-01

## 2018-01-01 RX ORDER — HEPARIN SODIUM 5000 [USP'U]/ML
7500 INJECTION, SOLUTION INTRAVENOUS; SUBCUTANEOUS EVERY 8 HOURS SCHEDULED
Status: DISCONTINUED | OUTPATIENT
Start: 2018-01-01 | End: 2018-01-01

## 2018-01-01 RX ORDER — DEXTROSE MONOHYDRATE 100 MG/ML
50 INJECTION, SOLUTION INTRAVENOUS CONTINUOUS
Status: DISCONTINUED | OUTPATIENT
Start: 2018-01-01 | End: 2018-01-01

## 2018-01-01 RX ORDER — NICOTINE 21 MG/24HR
21 PATCH, TRANSDERMAL 24 HOURS TRANSDERMAL DAILY
Status: DISCONTINUED | OUTPATIENT
Start: 2018-01-01 | End: 2018-01-01

## 2018-01-01 RX ORDER — DEXTROSE MONOHYDRATE 25 G/50ML
50 INJECTION, SOLUTION INTRAVENOUS ONCE
Status: COMPLETED | OUTPATIENT
Start: 2018-01-01 | End: 2018-01-01

## 2018-01-01 RX ORDER — DOBUTAMINE HYDROCHLORIDE 200 MG/100ML
2.5 INJECTION INTRAVENOUS CONTINUOUS
Status: DISCONTINUED | OUTPATIENT
Start: 2018-01-01 | End: 2018-01-01

## 2018-01-01 RX ORDER — FUROSEMIDE 10 MG/ML
20 SYRINGE (ML) INJECTION CONTINUOUS
Status: DISCONTINUED | OUTPATIENT
Start: 2018-01-01 | End: 2018-01-01

## 2018-01-01 RX ORDER — NYSTATIN 100000 [USP'U]/G
POWDER TOPICAL 2 TIMES DAILY
Status: DISCONTINUED | OUTPATIENT
Start: 2018-01-01 | End: 2018-01-01

## 2018-01-01 RX ORDER — MIDAZOLAM HYDROCHLORIDE 1 MG/ML
2 INJECTION INTRAMUSCULAR; INTRAVENOUS ONCE
Status: COMPLETED | OUTPATIENT
Start: 2018-01-01 | End: 2018-01-01

## 2018-01-01 RX ORDER — LEVALBUTEROL 1.25 MG/.5ML
1.25 SOLUTION, CONCENTRATE RESPIRATORY (INHALATION)
Status: DISCONTINUED | OUTPATIENT
Start: 2018-01-01 | End: 2018-01-01

## 2018-01-01 RX ORDER — CHLORHEXIDINE GLUCONATE 0.12 MG/ML
15 RINSE ORAL EVERY 12 HOURS SCHEDULED
Status: DISCONTINUED | OUTPATIENT
Start: 2018-01-01 | End: 2018-01-01

## 2018-01-01 RX ORDER — SODIUM CHLORIDE, SODIUM GLUCONATE, SODIUM ACETATE, POTASSIUM CHLORIDE, MAGNESIUM CHLORIDE, SODIUM PHOSPHATE, DIBASIC, AND POTASSIUM PHOSPHATE .53; .5; .37; .037; .03; .012; .00082 G/100ML; G/100ML; G/100ML; G/100ML; G/100ML; G/100ML; G/100ML
1000 INJECTION, SOLUTION INTRAVENOUS ONCE
Status: COMPLETED | OUTPATIENT
Start: 2018-01-01 | End: 2018-01-01

## 2018-01-01 RX ORDER — ACETAMINOPHEN 325 MG/1
650 TABLET ORAL EVERY 6 HOURS PRN
Status: DISCONTINUED | OUTPATIENT
Start: 2018-01-01 | End: 2018-12-23 | Stop reason: HOSPADM

## 2018-01-01 RX ORDER — ACETAMINOPHEN 160 MG/5ML
975 SUSPENSION, ORAL (FINAL DOSE FORM) ORAL ONCE
Status: DISCONTINUED | OUTPATIENT
Start: 2018-01-01 | End: 2018-01-01

## 2018-01-01 RX ORDER — ALLOPURINOL 300 MG/1
300 TABLET ORAL DAILY
COMMUNITY
End: 2018-12-23 | Stop reason: HOSPADM

## 2018-01-01 RX ORDER — ALBUTEROL SULFATE 2.5 MG/3ML
2.5 SOLUTION RESPIRATORY (INHALATION) EVERY 6 HOURS PRN
Status: DISCONTINUED | OUTPATIENT
Start: 2018-01-01 | End: 2018-01-01

## 2018-01-01 RX ORDER — LISINOPRIL 10 MG/1
10 TABLET ORAL DAILY
COMMUNITY
End: 2018-12-23 | Stop reason: HOSPADM

## 2018-01-01 RX ORDER — DEXTROSE MONOHYDRATE 25 G/50ML
INJECTION, SOLUTION INTRAVENOUS
Status: COMPLETED
Start: 2018-01-01 | End: 2018-01-01

## 2018-01-01 RX ORDER — AMOXICILLIN 250 MG
1 CAPSULE ORAL 2 TIMES DAILY
Status: DISCONTINUED | OUTPATIENT
Start: 2018-01-01 | End: 2018-01-01

## 2018-01-01 RX ORDER — MIDAZOLAM HYDROCHLORIDE 1 MG/ML
2 INJECTION INTRAMUSCULAR; INTRAVENOUS EVERY 4 HOURS PRN
Status: DISCONTINUED | OUTPATIENT
Start: 2018-01-01 | End: 2018-01-01

## 2018-01-01 RX ORDER — LIDOCAINE HYDROCHLORIDE 20 MG/ML
INJECTION, SOLUTION EPIDURAL; INFILTRATION; INTRACAUDAL; PERINEURAL
Status: DISPENSED
Start: 2018-01-01 | End: 2018-01-01

## 2018-01-01 RX ORDER — FENTANYL CITRATE 50 UG/ML
INJECTION, SOLUTION INTRAMUSCULAR; INTRAVENOUS
Status: COMPLETED
Start: 2018-01-01 | End: 2018-01-01

## 2018-01-01 RX ORDER — FENTANYL CITRATE 50 UG/ML
50 INJECTION, SOLUTION INTRAMUSCULAR; INTRAVENOUS
Status: DISCONTINUED | OUTPATIENT
Start: 2018-01-01 | End: 2018-01-01

## 2018-01-01 RX ORDER — FUROSEMIDE 10 MG/ML
40 INJECTION INTRAMUSCULAR; INTRAVENOUS ONCE
Status: COMPLETED | OUTPATIENT
Start: 2018-01-01 | End: 2018-01-01

## 2018-01-01 RX ORDER — ALBUTEROL SULFATE 2.5 MG/3ML
2.5 SOLUTION RESPIRATORY (INHALATION) EVERY 6 HOURS PRN
Status: DISCONTINUED | OUTPATIENT
Start: 2018-01-01 | End: 2018-12-23 | Stop reason: HOSPADM

## 2018-01-01 RX ORDER — MIDAZOLAM HYDROCHLORIDE 1 MG/ML
INJECTION INTRAMUSCULAR; INTRAVENOUS
Status: DISCONTINUED
Start: 2018-01-01 | End: 2018-01-01 | Stop reason: WASHOUT

## 2018-01-01 RX ORDER — LORAZEPAM 2 MG/ML
2 INJECTION INTRAMUSCULAR EVERY 2 HOUR PRN
Status: DISCONTINUED | OUTPATIENT
Start: 2018-01-01 | End: 2018-12-23 | Stop reason: HOSPADM

## 2018-01-01 RX ORDER — MIDAZOLAM HYDROCHLORIDE 1 MG/ML
INJECTION INTRAMUSCULAR; INTRAVENOUS
Status: COMPLETED
Start: 2018-01-01 | End: 2018-01-01

## 2018-01-01 RX ORDER — SODIUM CHLORIDE, SODIUM GLUCONATE, SODIUM ACETATE, POTASSIUM CHLORIDE, MAGNESIUM CHLORIDE, SODIUM PHOSPHATE, DIBASIC, AND POTASSIUM PHOSPHATE .53; .5; .37; .037; .03; .012; .00082 G/100ML; G/100ML; G/100ML; G/100ML; G/100ML; G/100ML; G/100ML
500 INJECTION, SOLUTION INTRAVENOUS ONCE
Status: COMPLETED | OUTPATIENT
Start: 2018-01-01 | End: 2018-01-01

## 2018-01-01 RX ORDER — HEPARIN SODIUM 5000 [USP'U]/ML
5000 INJECTION, SOLUTION INTRAVENOUS; SUBCUTANEOUS EVERY 8 HOURS SCHEDULED
Status: DISCONTINUED | OUTPATIENT
Start: 2018-01-01 | End: 2018-01-01

## 2018-01-01 RX ORDER — FENTANYL CITRATE 50 UG/ML
100 INJECTION, SOLUTION INTRAMUSCULAR; INTRAVENOUS
Status: DISCONTINUED | OUTPATIENT
Start: 2018-01-01 | End: 2018-12-23 | Stop reason: HOSPADM

## 2018-01-01 RX ORDER — ALBUMIN (HUMAN) 12.5 G/50ML
25 SOLUTION INTRAVENOUS ONCE
Status: DISCONTINUED | OUTPATIENT
Start: 2018-01-01 | End: 2018-01-01

## 2018-01-01 RX ORDER — FUROSEMIDE 10 MG/ML
60 INJECTION INTRAMUSCULAR; INTRAVENOUS ONCE
Status: COMPLETED | OUTPATIENT
Start: 2018-01-01 | End: 2018-01-01

## 2018-01-01 RX ORDER — HEPARIN SODIUM 1000 [USP'U]/ML
4000 INJECTION, SOLUTION INTRAVENOUS; SUBCUTANEOUS AS NEEDED
Status: DISCONTINUED | OUTPATIENT
Start: 2018-01-01 | End: 2018-01-01

## 2018-01-01 RX ORDER — ALBUMIN (HUMAN) 12.5 G/50ML
25 SOLUTION INTRAVENOUS ONCE
Status: COMPLETED | OUTPATIENT
Start: 2018-01-01 | End: 2018-01-01

## 2018-01-01 RX ORDER — DEXTROSE AND SODIUM CHLORIDE 5; .45 G/100ML; G/100ML
125 INJECTION, SOLUTION INTRAVENOUS CONTINUOUS
Status: DISCONTINUED | OUTPATIENT
Start: 2018-01-01 | End: 2018-01-01

## 2018-01-01 RX ORDER — HEPARIN SODIUM 1000 [USP'U]/ML
2000 INJECTION, SOLUTION INTRAVENOUS; SUBCUTANEOUS AS NEEDED
Status: DISCONTINUED | OUTPATIENT
Start: 2018-01-01 | End: 2018-01-01

## 2018-01-01 RX ORDER — IPRATROPIUM BROMIDE AND ALBUTEROL SULFATE 2.5; .5 MG/3ML; MG/3ML
3 SOLUTION RESPIRATORY (INHALATION)
Status: DISCONTINUED | OUTPATIENT
Start: 2018-01-01 | End: 2018-01-01

## 2018-01-01 RX ORDER — ACETAMINOPHEN 160 MG/5ML
650 SUSPENSION, ORAL (FINAL DOSE FORM) ORAL ONCE
Status: COMPLETED | OUTPATIENT
Start: 2018-01-01 | End: 2018-01-01

## 2018-01-01 RX ORDER — FUROSEMIDE 10 MG/ML
80 INJECTION INTRAMUSCULAR; INTRAVENOUS ONCE
Status: COMPLETED | OUTPATIENT
Start: 2018-01-01 | End: 2018-01-01

## 2018-01-01 RX ORDER — VANCOMYCIN HYDROCHLORIDE 1 G/200ML
1000 INJECTION, SOLUTION INTRAVENOUS EVERY 12 HOURS
Status: DISCONTINUED | OUTPATIENT
Start: 2018-01-01 | End: 2018-01-01

## 2018-01-01 RX ADMIN — METRONIDAZOLE 500 MG: 500 INJECTION, SOLUTION INTRAVENOUS at 10:47

## 2018-01-01 RX ADMIN — VASOPRESSIN 0.04 UNITS/MIN: 20 INJECTION INTRAVENOUS at 17:14

## 2018-01-01 RX ADMIN — VANCOMYCIN HYDROCHLORIDE 1250 MG: 5 INJECTION, POWDER, LYOPHILIZED, FOR SOLUTION INTRAVENOUS at 14:51

## 2018-01-01 RX ADMIN — CHLORHEXIDINE GLUCONATE 0.12% ORAL RINSE 15 ML: 1.2 LIQUID ORAL at 21:16

## 2018-01-01 RX ADMIN — Medication 0.04 UNITS/MIN: at 17:54

## 2018-01-01 RX ADMIN — Medication 1 MCG/KG/HR: at 01:31

## 2018-01-01 RX ADMIN — CHLORHEXIDINE GLUCONATE 0.12% ORAL RINSE 15 ML: 1.2 LIQUID ORAL at 08:58

## 2018-01-01 RX ADMIN — Medication 20 MG/HR: at 10:54

## 2018-01-01 RX ADMIN — ASCORBIC ACID 1500 MG: 500 INJECTION, SOLUTION INTRAMUSCULAR; INTRAVENOUS; SUBCUTANEOUS at 19:55

## 2018-01-01 RX ADMIN — DEXTROSE MONOHYDRATE 50 ML: 25 INJECTION, SOLUTION INTRAVENOUS at 07:47

## 2018-01-01 RX ADMIN — METRONIDAZOLE 500 MG: 500 INJECTION, SOLUTION INTRAVENOUS at 18:50

## 2018-01-01 RX ADMIN — HYDROCORTISONE SODIUM SUCCINATE 50 MG: 100 INJECTION, POWDER, FOR SOLUTION INTRAMUSCULAR; INTRAVENOUS at 12:01

## 2018-01-01 RX ADMIN — CEFTRIAXONE SODIUM 1000 MG: 2 INJECTION, POWDER, FOR SOLUTION INTRAMUSCULAR; INTRAVENOUS at 05:09

## 2018-01-01 RX ADMIN — HYDROCORTISONE SODIUM SUCCINATE 100 MG: 100 INJECTION, POWDER, FOR SOLUTION INTRAMUSCULAR; INTRAVENOUS at 23:38

## 2018-01-01 RX ADMIN — ALBUMIN HUMAN 12.5 G: 0.05 INJECTION, SOLUTION INTRAVENOUS at 23:47

## 2018-01-01 RX ADMIN — HEPARIN SODIUM 7500 UNITS: 5000 INJECTION, SOLUTION INTRAVENOUS; SUBCUTANEOUS at 05:45

## 2018-01-01 RX ADMIN — SODIUM CHLORIDE, SODIUM GLUCONATE, SODIUM ACETATE, POTASSIUM CHLORIDE, MAGNESIUM CHLORIDE, SODIUM PHOSPHATE, DIBASIC, AND POTASSIUM PHOSPHATE 500 ML: .53; .5; .37; .037; .03; .012; .00082 INJECTION, SOLUTION INTRAVENOUS at 03:03

## 2018-01-01 RX ADMIN — FENTANYL CITRATE 50 MCG: 50 INJECTION, SOLUTION INTRAMUSCULAR; INTRAVENOUS at 22:34

## 2018-01-01 RX ADMIN — IPRATROPIUM BROMIDE 0.5 MG: 0.5 SOLUTION RESPIRATORY (INHALATION) at 13:26

## 2018-01-01 RX ADMIN — VANCOMYCIN HYDROCHLORIDE 2000 MG: 1 INJECTION, POWDER, LYOPHILIZED, FOR SOLUTION INTRAVENOUS at 07:43

## 2018-01-01 RX ADMIN — VANCOMYCIN HYDROCHLORIDE 1250 MG: 5 INJECTION, POWDER, LYOPHILIZED, FOR SOLUTION INTRAVENOUS at 19:31

## 2018-01-01 RX ADMIN — LORAZEPAM 2 MG: 2 INJECTION INTRAMUSCULAR; INTRAVENOUS at 21:27

## 2018-01-01 RX ADMIN — HEPARIN SODIUM 5000 UNITS: 5000 INJECTION, SOLUTION INTRAVENOUS; SUBCUTANEOUS at 21:16

## 2018-01-01 RX ADMIN — NICOTINE 21 MG: 21 PATCH, EXTENDED RELEASE TRANSDERMAL at 08:22

## 2018-01-01 RX ADMIN — CEFTRIAXONE 1000 MG: 1 INJECTION, POWDER, FOR SOLUTION INTRAMUSCULAR; INTRAVENOUS at 05:44

## 2018-01-01 RX ADMIN — FUROSEMIDE 80 MG: 10 INJECTION, SOLUTION INTRAMUSCULAR; INTRAVENOUS at 16:18

## 2018-01-01 RX ADMIN — LEVALBUTEROL HYDROCHLORIDE 1.25 MG: 1.25 SOLUTION, CONCENTRATE RESPIRATORY (INHALATION) at 19:15

## 2018-01-01 RX ADMIN — HYDROCORTISONE SODIUM SUCCINATE 50 MG: 100 INJECTION, POWDER, FOR SOLUTION INTRAMUSCULAR; INTRAVENOUS at 05:09

## 2018-01-01 RX ADMIN — IPRATROPIUM BROMIDE 0.5 MG: 0.5 SOLUTION RESPIRATORY (INHALATION) at 16:24

## 2018-01-01 RX ADMIN — FENTANYL CITRATE 50 MCG: 50 INJECTION, SOLUTION INTRAMUSCULAR; INTRAVENOUS at 02:15

## 2018-01-01 RX ADMIN — ACETAMINOPHEN 650 MG: 325 TABLET, FILM COATED ORAL at 19:45

## 2018-01-01 RX ADMIN — FAMOTIDINE 20 MG: 10 INJECTION, SOLUTION INTRAVENOUS at 05:09

## 2018-01-01 RX ADMIN — HEPARIN SODIUM 5000 UNITS: 5000 INJECTION, SOLUTION INTRAVENOUS; SUBCUTANEOUS at 05:09

## 2018-01-01 RX ADMIN — Medication 0.2 MCG/KG/HR: at 22:22

## 2018-01-01 RX ADMIN — LEVALBUTEROL HYDROCHLORIDE 1.25 MG: 1.25 SOLUTION, CONCENTRATE RESPIRATORY (INHALATION) at 19:40

## 2018-01-01 RX ADMIN — NYSTATIN: 100000 POWDER TOPICAL at 08:58

## 2018-01-01 RX ADMIN — FUROSEMIDE 40 MG: 10 INJECTION, SOLUTION INTRAMUSCULAR; INTRAVENOUS at 16:17

## 2018-01-01 RX ADMIN — AZITHROMYCIN MONOHYDRATE 500 MG: 500 INJECTION, POWDER, LYOPHILIZED, FOR SOLUTION INTRAVENOUS at 12:19

## 2018-01-01 RX ADMIN — IPRATROPIUM BROMIDE 0.5 MG: 0.5 SOLUTION RESPIRATORY (INHALATION) at 14:08

## 2018-01-01 RX ADMIN — CHLORHEXIDINE GLUCONATE 0.12% ORAL RINSE 15 ML: 1.2 LIQUID ORAL at 10:05

## 2018-01-01 RX ADMIN — SENNOSIDES AND DOCUSATE SODIUM 1 TABLET: 8.6; 5 TABLET ORAL at 09:10

## 2018-01-01 RX ADMIN — VASOPRESSIN 0.04 UNITS/MIN: 20 INJECTION INTRAVENOUS at 00:43

## 2018-01-01 RX ADMIN — SENNOSIDES AND DOCUSATE SODIUM 1 TABLET: 8.6; 5 TABLET ORAL at 17:53

## 2018-01-01 RX ADMIN — METRONIDAZOLE 500 MG: 500 INJECTION, SOLUTION INTRAVENOUS at 19:36

## 2018-01-01 RX ADMIN — HEPARIN SODIUM 5000 UNITS: 5000 INJECTION, SOLUTION INTRAVENOUS; SUBCUTANEOUS at 05:00

## 2018-01-01 RX ADMIN — NOREPINEPHRINE BITARTRATE 8 MCG/MIN: 1 INJECTION INTRAVENOUS at 15:33

## 2018-01-01 RX ADMIN — INSULIN LISPRO 2 UNITS: 100 INJECTION, SOLUTION INTRAVENOUS; SUBCUTANEOUS at 12:07

## 2018-01-01 RX ADMIN — LEVALBUTEROL HYDROCHLORIDE 1.25 MG: 1.25 SOLUTION, CONCENTRATE RESPIRATORY (INHALATION) at 16:24

## 2018-01-01 RX ADMIN — DEXTROSE MONOHYDRATE 50 ML: 25 INJECTION, SOLUTION INTRAVENOUS at 06:31

## 2018-01-01 RX ADMIN — Medication 0.4 MCG/KG/HR: at 15:37

## 2018-01-01 RX ADMIN — SODIUM CHLORIDE 1000 ML: 0.9 INJECTION, SOLUTION INTRAVENOUS at 07:12

## 2018-01-01 RX ADMIN — INSULIN LISPRO 2 UNITS: 100 INJECTION, SOLUTION INTRAVENOUS; SUBCUTANEOUS at 06:32

## 2018-01-01 RX ADMIN — NOREPINEPHRINE BITARTRATE 7 MCG/MIN: 1 INJECTION INTRAVENOUS at 07:53

## 2018-01-01 RX ADMIN — HEPARIN SODIUM 4000 UNITS: 1000 INJECTION, SOLUTION INTRAVENOUS; SUBCUTANEOUS at 10:26

## 2018-01-01 RX ADMIN — HEPARIN SODIUM 5000 UNITS: 5000 INJECTION, SOLUTION INTRAVENOUS; SUBCUTANEOUS at 23:33

## 2018-01-01 RX ADMIN — IPRATROPIUM BROMIDE 0.5 MG: 0.5 SOLUTION RESPIRATORY (INHALATION) at 19:15

## 2018-01-01 RX ADMIN — IPRATROPIUM BROMIDE 0.5 MG: 0.5 SOLUTION RESPIRATORY (INHALATION) at 01:42

## 2018-01-01 RX ADMIN — HEPARIN SODIUM 7500 UNITS: 5000 INJECTION, SOLUTION INTRAVENOUS; SUBCUTANEOUS at 22:00

## 2018-01-01 RX ADMIN — VASOPRESSIN 0.04 UNITS/MIN: 20 INJECTION INTRAVENOUS at 03:09

## 2018-01-01 RX ADMIN — NOREPINEPHRINE BITARTRATE 15 MCG/MIN: 1 INJECTION INTRAVENOUS at 06:11

## 2018-01-01 RX ADMIN — NOREPINEPHRINE BITARTRATE 14 MCG/MIN: 1 INJECTION INTRAVENOUS at 07:19

## 2018-01-01 RX ADMIN — ASCORBIC ACID 1500 MG: 500 INJECTION, SOLUTION INTRAMUSCULAR; INTRAVENOUS; SUBCUTANEOUS at 19:51

## 2018-01-01 RX ADMIN — NYSTATIN: 100000 POWDER TOPICAL at 17:54

## 2018-01-01 RX ADMIN — SENNOSIDES AND DOCUSATE SODIUM 1 TABLET: 8.6; 5 TABLET ORAL at 17:42

## 2018-01-01 RX ADMIN — METRONIDAZOLE 500 MG: 500 INJECTION, SOLUTION INTRAVENOUS at 02:41

## 2018-01-01 RX ADMIN — NICOTINE 21 MG: 21 PATCH, EXTENDED RELEASE TRANSDERMAL at 16:36

## 2018-01-01 RX ADMIN — DEXTROSE MONOHYDRATE 50 ML: 25 INJECTION, SOLUTION INTRAVENOUS at 11:32

## 2018-01-01 RX ADMIN — ACETAMINOPHEN 650 MG: 325 TABLET, FILM COATED ORAL at 09:10

## 2018-01-01 RX ADMIN — DEXTROSE MONOHYDRATE 50 ML: 25 INJECTION, SOLUTION INTRAVENOUS at 07:50

## 2018-01-01 RX ADMIN — FUROSEMIDE 60 MG: 10 INJECTION, SOLUTION INTRAMUSCULAR; INTRAVENOUS at 08:44

## 2018-01-01 RX ADMIN — NYSTATIN 1 APPLICATION: 100000 POWDER TOPICAL at 17:49

## 2018-01-01 RX ADMIN — DEXTROSE 100 ML/HR: 10 SOLUTION INTRAVENOUS at 16:58

## 2018-01-01 RX ADMIN — NYSTATIN 1 APPLICATION: 100000 POWDER TOPICAL at 17:32

## 2018-01-01 RX ADMIN — HEPARIN SODIUM 7500 UNITS: 5000 INJECTION, SOLUTION INTRAVENOUS; SUBCUTANEOUS at 21:32

## 2018-01-01 RX ADMIN — NYSTATIN: 100000 POWDER TOPICAL at 08:47

## 2018-01-01 RX ADMIN — HEPARIN SODIUM AND DEXTROSE 11.1 UNITS/KG/HR: 10000; 5 INJECTION INTRAVENOUS at 10:23

## 2018-01-01 RX ADMIN — SODIUM CHLORIDE, SODIUM GLUCONATE, SODIUM ACETATE, POTASSIUM CHLORIDE, MAGNESIUM CHLORIDE, SODIUM PHOSPHATE, DIBASIC, AND POTASSIUM PHOSPHATE 1000 ML: .53; .5; .37; .037; .03; .012; .00082 INJECTION, SOLUTION INTRAVENOUS at 00:59

## 2018-01-01 RX ADMIN — VANCOMYCIN HYDROCHLORIDE 1250 MG: 5 INJECTION, POWDER, LYOPHILIZED, FOR SOLUTION INTRAVENOUS at 08:35

## 2018-01-01 RX ADMIN — IPRATROPIUM BROMIDE 0.5 MG: 0.5 SOLUTION RESPIRATORY (INHALATION) at 07:58

## 2018-01-01 RX ADMIN — NICOTINE 7 MG: 7 PATCH TRANSDERMAL at 08:57

## 2018-01-01 RX ADMIN — HEPARIN SODIUM 7500 UNITS: 5000 INJECTION, SOLUTION INTRAVENOUS; SUBCUTANEOUS at 13:22

## 2018-01-01 RX ADMIN — HYDROCORTISONE SODIUM SUCCINATE 50 MG: 100 INJECTION, POWDER, FOR SOLUTION INTRAMUSCULAR; INTRAVENOUS at 04:58

## 2018-01-01 RX ADMIN — VANCOMYCIN HYDROCHLORIDE 2000 MG: 1 INJECTION, POWDER, LYOPHILIZED, FOR SOLUTION INTRAVENOUS at 02:36

## 2018-01-01 RX ADMIN — ASCORBIC ACID 1500 MG: 500 INJECTION, SOLUTION INTRAMUSCULAR; INTRAVENOUS; SUBCUTANEOUS at 10:08

## 2018-01-01 RX ADMIN — FENTANYL CITRATE 50 MCG: 50 INJECTION, SOLUTION INTRAMUSCULAR; INTRAVENOUS at 20:10

## 2018-01-01 RX ADMIN — Medication 1 MCG/KG/HR: at 08:34

## 2018-01-01 RX ADMIN — Medication 1 MCG/KG/HR: at 21:44

## 2018-01-01 RX ADMIN — IPRATROPIUM BROMIDE 0.5 MG: 0.5 SOLUTION RESPIRATORY (INHALATION) at 19:40

## 2018-01-01 RX ADMIN — VANCOMYCIN HYDROCHLORIDE 1000 MG: 1 INJECTION, SOLUTION INTRAVENOUS at 17:42

## 2018-01-01 RX ADMIN — HEPARIN SODIUM 5000 UNITS: 5000 INJECTION, SOLUTION INTRAVENOUS; SUBCUTANEOUS at 14:51

## 2018-01-01 RX ADMIN — HYDROCORTISONE SODIUM SUCCINATE 50 MG: 100 INJECTION, POWDER, FOR SOLUTION INTRAMUSCULAR; INTRAVENOUS at 22:19

## 2018-01-01 RX ADMIN — CEFTRIAXONE SODIUM 1000 MG: 10 INJECTION, POWDER, FOR SOLUTION INTRAVENOUS at 07:07

## 2018-01-01 RX ADMIN — THIAMINE HYDROCHLORIDE 200 MG: 100 INJECTION, SOLUTION INTRAMUSCULAR; INTRAVENOUS at 10:08

## 2018-01-01 RX ADMIN — SODIUM CHLORIDE 1000 ML: 0.9 INJECTION, SOLUTION INTRAVENOUS at 06:54

## 2018-01-01 RX ADMIN — Medication 1 MCG/KG/HR: at 19:45

## 2018-01-01 RX ADMIN — ASCORBIC ACID 1500 MG: 500 INJECTION, SOLUTION INTRAMUSCULAR; INTRAVENOUS; SUBCUTANEOUS at 14:51

## 2018-01-01 RX ADMIN — NYSTATIN: 100000 POWDER TOPICAL at 18:13

## 2018-01-01 RX ADMIN — CEFEPIME HYDROCHLORIDE 2000 MG: 2 INJECTION, POWDER, FOR SOLUTION INTRAVENOUS at 10:08

## 2018-01-01 RX ADMIN — FUROSEMIDE 100 MG: 10 INJECTION, SOLUTION INTRAMUSCULAR; INTRAVENOUS at 22:29

## 2018-01-01 RX ADMIN — HEPARIN SODIUM 7500 UNITS: 5000 INJECTION, SOLUTION INTRAVENOUS; SUBCUTANEOUS at 06:34

## 2018-01-01 RX ADMIN — CEFTRIAXONE 1000 MG: 1 INJECTION, POWDER, FOR SOLUTION INTRAMUSCULAR; INTRAVENOUS at 18:13

## 2018-01-01 RX ADMIN — LEVALBUTEROL HYDROCHLORIDE 1.25 MG: 1.25 SOLUTION, CONCENTRATE RESPIRATORY (INHALATION) at 19:10

## 2018-01-01 RX ADMIN — CEFTRIAXONE 1000 MG: 1 INJECTION, POWDER, FOR SOLUTION INTRAMUSCULAR; INTRAVENOUS at 17:49

## 2018-01-01 RX ADMIN — Medication 0.04 UNITS/MIN: at 15:33

## 2018-01-01 RX ADMIN — METRONIDAZOLE 500 MG: 500 INJECTION, SOLUTION INTRAVENOUS at 17:53

## 2018-01-01 RX ADMIN — SODIUM CHLORIDE 1000 ML: 0.9 INJECTION, SOLUTION INTRAVENOUS at 06:57

## 2018-01-01 RX ADMIN — VASOPRESSIN 0.04 UNITS/MIN: 20 INJECTION INTRAVENOUS at 12:21

## 2018-01-01 RX ADMIN — THIAMINE HYDROCHLORIDE 200 MG: 100 INJECTION, SOLUTION INTRAMUSCULAR; INTRAVENOUS at 09:14

## 2018-01-01 RX ADMIN — ALBUTEROL SULFATE 2.5 MG: 2.5 SOLUTION RESPIRATORY (INHALATION) at 04:17

## 2018-01-01 RX ADMIN — Medication 50 MCG/HR: at 22:26

## 2018-01-01 RX ADMIN — MIDAZOLAM HYDROCHLORIDE 2 MG: 1 INJECTION, SOLUTION INTRAMUSCULAR; INTRAVENOUS at 06:33

## 2018-01-01 RX ADMIN — NOREPINEPHRINE BITARTRATE 11 MCG/MIN: 1 INJECTION INTRAVENOUS at 11:20

## 2018-01-01 RX ADMIN — DEXTROSE 100 ML/HR: 10 SOLUTION INTRAVENOUS at 08:10

## 2018-01-01 RX ADMIN — ACETAMINOPHEN 650 MG: 325 TABLET, FILM COATED ORAL at 10:22

## 2018-01-01 RX ADMIN — NOREPINEPHRINE BITARTRATE 10 MCG/MIN: 1 INJECTION INTRAVENOUS at 03:09

## 2018-01-01 RX ADMIN — ASCORBIC ACID 1500 MG: 500 INJECTION, SOLUTION INTRAMUSCULAR; INTRAVENOUS; SUBCUTANEOUS at 15:37

## 2018-01-01 RX ADMIN — FUROSEMIDE 60 MG: 10 INJECTION, SOLUTION INTRAMUSCULAR; INTRAVENOUS at 17:21

## 2018-01-01 RX ADMIN — VANCOMYCIN HYDROCHLORIDE 1250 MG: 5 INJECTION, POWDER, LYOPHILIZED, FOR SOLUTION INTRAVENOUS at 01:32

## 2018-01-01 RX ADMIN — MIDAZOLAM HYDROCHLORIDE 2 MG: 1 INJECTION, SOLUTION INTRAMUSCULAR; INTRAVENOUS at 10:46

## 2018-01-01 RX ADMIN — NYSTATIN: 100000 POWDER TOPICAL at 10:04

## 2018-01-01 RX ADMIN — CEFEPIME HYDROCHLORIDE 2000 MG: 2 INJECTION, POWDER, FOR SOLUTION INTRAVENOUS at 10:00

## 2018-01-01 RX ADMIN — FENTANYL CITRATE 50 MCG: 50 INJECTION, SOLUTION INTRAMUSCULAR; INTRAVENOUS at 02:41

## 2018-01-01 RX ADMIN — NOREPINEPHRINE BITARTRATE 16 MCG/MIN: 1 INJECTION INTRAVENOUS at 21:15

## 2018-01-01 RX ADMIN — LIDOCAINE HYDROCHLORIDE 10 MG: 10 INJECTION, SOLUTION EPIDURAL; INFILTRATION; INTRACAUDAL; PERINEURAL at 19:27

## 2018-01-01 RX ADMIN — MIDAZOLAM HYDROCHLORIDE 2 MG: 1 INJECTION INTRAMUSCULAR; INTRAVENOUS at 10:46

## 2018-01-01 RX ADMIN — Medication 1 MCG/KG/HR: at 14:07

## 2018-01-01 RX ADMIN — Medication 20 MG: at 10:05

## 2018-01-01 RX ADMIN — FENTANYL CITRATE 50 MCG/HR: 50 INJECTION, SOLUTION INTRAMUSCULAR; INTRAVENOUS at 16:23

## 2018-01-01 RX ADMIN — LEVALBUTEROL HYDROCHLORIDE 1.25 MG: 1.25 SOLUTION, CONCENTRATE RESPIRATORY (INHALATION) at 14:08

## 2018-01-01 RX ADMIN — ASCORBIC ACID 1500 MG: 500 INJECTION, SOLUTION INTRAMUSCULAR; INTRAVENOUS; SUBCUTANEOUS at 11:27

## 2018-01-01 RX ADMIN — NOREPINEPHRINE BITARTRATE 18 MCG/MIN: 1 INJECTION INTRAVENOUS at 22:09

## 2018-01-01 RX ADMIN — Medication 1 MCG/KG/HR: at 12:24

## 2018-01-01 RX ADMIN — LEVALBUTEROL HYDROCHLORIDE 1.25 MG: 1.25 SOLUTION, CONCENTRATE RESPIRATORY (INHALATION) at 01:10

## 2018-01-01 RX ADMIN — LIDOCAINE HYDROCHLORIDE 50 MG: 10 INJECTION, SOLUTION EPIDURAL; INFILTRATION; INTRACAUDAL; PERINEURAL at 17:21

## 2018-01-01 RX ADMIN — DEXTROSE AND SODIUM CHLORIDE 125 ML/HR: 5; .45 INJECTION, SOLUTION INTRAVENOUS at 00:00

## 2018-01-01 RX ADMIN — VASOPRESSIN 0.04 UNITS/MIN: 20 INJECTION INTRAVENOUS at 09:58

## 2018-01-01 RX ADMIN — Medication 0.04 UNITS/MIN: at 15:37

## 2018-01-01 RX ADMIN — SODIUM CHLORIDE, SODIUM GLUCONATE, SODIUM ACETATE, POTASSIUM CHLORIDE, MAGNESIUM CHLORIDE, SODIUM PHOSPHATE, DIBASIC, AND POTASSIUM PHOSPHATE 500 ML: .53; .5; .37; .037; .03; .012; .00082 INJECTION, SOLUTION INTRAVENOUS at 13:57

## 2018-01-01 RX ADMIN — METRONIDAZOLE 500 MG: 500 INJECTION, SOLUTION INTRAVENOUS at 10:23

## 2018-01-01 RX ADMIN — FENTANYL CITRATE 50 MCG: 50 INJECTION, SOLUTION INTRAMUSCULAR; INTRAVENOUS at 03:53

## 2018-01-01 RX ADMIN — SENNOSIDES AND DOCUSATE SODIUM 1 TABLET: 8.6; 5 TABLET ORAL at 10:05

## 2018-01-01 RX ADMIN — Medication 0.4 MCG/KG/HR: at 05:23

## 2018-01-01 RX ADMIN — HEPARIN SODIUM 7500 UNITS: 5000 INJECTION, SOLUTION INTRAVENOUS; SUBCUTANEOUS at 15:12

## 2018-01-01 RX ADMIN — DEXTROSE 100 ML/HR: 10 SOLUTION INTRAVENOUS at 03:04

## 2018-01-01 RX ADMIN — NOREPINEPHRINE BITARTRATE 8 MCG/MIN: 1 INJECTION INTRAVENOUS at 18:51

## 2018-01-01 RX ADMIN — HEPARIN SODIUM 7500 UNITS: 5000 INJECTION, SOLUTION INTRAVENOUS; SUBCUTANEOUS at 14:33

## 2018-01-01 RX ADMIN — ACETAMINOPHEN 325 MG: 325 TABLET, FILM COATED ORAL at 10:46

## 2018-01-01 RX ADMIN — THIAMINE HYDROCHLORIDE 200 MG: 100 INJECTION, SOLUTION INTRAMUSCULAR; INTRAVENOUS at 19:55

## 2018-01-01 RX ADMIN — IBUPROFEN 600 MG: 100 SUSPENSION ORAL at 01:24

## 2018-01-01 RX ADMIN — FENTANYL CITRATE 50 MCG: 50 INJECTION, SOLUTION INTRAMUSCULAR; INTRAVENOUS at 21:41

## 2018-01-01 RX ADMIN — CEFEPIME HYDROCHLORIDE 2000 MG: 2 INJECTION, POWDER, FOR SOLUTION INTRAVENOUS at 19:37

## 2018-01-01 RX ADMIN — AZITHROMYCIN MONOHYDRATE 500 MG: 500 INJECTION, POWDER, LYOPHILIZED, FOR SOLUTION INTRAVENOUS at 12:40

## 2018-01-01 RX ADMIN — IPRATROPIUM BROMIDE 0.5 MG: 0.5 SOLUTION RESPIRATORY (INHALATION) at 19:10

## 2018-01-01 RX ADMIN — FENTANYL CITRATE 50 MCG: 50 INJECTION, SOLUTION INTRAMUSCULAR; INTRAVENOUS at 21:55

## 2018-01-01 RX ADMIN — FENTANYL CITRATE 50 MCG: 50 INJECTION, SOLUTION INTRAMUSCULAR; INTRAVENOUS at 23:36

## 2018-01-01 RX ADMIN — THIAMINE HYDROCHLORIDE 200 MG: 100 INJECTION, SOLUTION INTRAMUSCULAR; INTRAVENOUS at 19:53

## 2018-01-01 RX ADMIN — Medication 1 MCG/KG/HR: at 10:41

## 2018-01-01 RX ADMIN — CHLORHEXIDINE GLUCONATE 0.12% ORAL RINSE 15 ML: 1.2 LIQUID ORAL at 08:20

## 2018-01-01 RX ADMIN — HYDROCORTISONE SODIUM SUCCINATE 50 MG: 100 INJECTION, POWDER, FOR SOLUTION INTRAMUSCULAR; INTRAVENOUS at 17:08

## 2018-01-01 RX ADMIN — FAMOTIDINE 20 MG: 10 INJECTION, SOLUTION INTRAVENOUS at 05:50

## 2018-01-01 RX ADMIN — FENTANYL CITRATE 50 MCG: 50 INJECTION, SOLUTION INTRAMUSCULAR; INTRAVENOUS at 21:26

## 2018-01-01 RX ADMIN — Medication 1 MCG/KG/HR: at 03:23

## 2018-01-01 RX ADMIN — ALBUMIN HUMAN 25 G: 0.25 SOLUTION INTRAVENOUS at 15:33

## 2018-01-01 RX ADMIN — NICOTINE 7 MG: 7 PATCH TRANSDERMAL at 10:29

## 2018-01-01 RX ADMIN — Medication 20 MG/HR: at 23:13

## 2018-01-01 RX ADMIN — ACETAMINOPHEN 650 MG: 160 SUSPENSION ORAL at 21:47

## 2018-01-01 RX ADMIN — NYSTATIN: 100000 POWDER TOPICAL at 12:39

## 2018-01-01 RX ADMIN — METRONIDAZOLE 500 MG: 500 INJECTION, SOLUTION INTRAVENOUS at 01:25

## 2018-01-01 RX ADMIN — LEVALBUTEROL HYDROCHLORIDE 1.25 MG: 1.25 SOLUTION, CONCENTRATE RESPIRATORY (INHALATION) at 07:12

## 2018-01-01 RX ADMIN — FENTANYL CITRATE 50 MCG: 50 INJECTION, SOLUTION INTRAMUSCULAR; INTRAVENOUS at 17:07

## 2018-01-01 RX ADMIN — CEFTRIAXONE 1000 MG: 1 INJECTION, POWDER, FOR SOLUTION INTRAMUSCULAR; INTRAVENOUS at 06:10

## 2018-01-01 RX ADMIN — NOREPINEPHRINE BITARTRATE 11 MCG/MIN: 1 INJECTION INTRAVENOUS at 09:23

## 2018-01-01 RX ADMIN — FENTANYL CITRATE 50 MCG: 50 INJECTION, SOLUTION INTRAMUSCULAR; INTRAVENOUS at 12:01

## 2018-01-01 RX ADMIN — FAMOTIDINE 20 MG: 10 INJECTION, SOLUTION INTRAVENOUS at 05:00

## 2018-01-01 RX ADMIN — MIDAZOLAM HYDROCHLORIDE 2 MG: 1 INJECTION, SOLUTION INTRAMUSCULAR; INTRAVENOUS at 00:26

## 2018-01-01 RX ADMIN — Medication 1 MCG/KG/HR: at 06:55

## 2018-01-01 RX ADMIN — IPRATROPIUM BROMIDE 0.5 MG: 0.5 SOLUTION RESPIRATORY (INHALATION) at 01:10

## 2018-01-01 RX ADMIN — Medication 0.04 UNITS/MIN: at 19:04

## 2018-01-01 RX ADMIN — SODIUM CHLORIDE 1000 ML: 0.9 INJECTION, SOLUTION INTRAVENOUS at 07:35

## 2018-01-01 RX ADMIN — CEFTRIAXONE SODIUM 1000 MG: 2 INJECTION, POWDER, FOR SOLUTION INTRAMUSCULAR; INTRAVENOUS at 17:30

## 2018-01-01 RX ADMIN — LEVALBUTEROL HYDROCHLORIDE 1.25 MG: 1.25 SOLUTION, CONCENTRATE RESPIRATORY (INHALATION) at 01:42

## 2018-01-01 RX ADMIN — ASCORBIC ACID 1500 MG: 500 INJECTION, SOLUTION INTRAMUSCULAR; INTRAVENOUS; SUBCUTANEOUS at 01:45

## 2018-01-01 RX ADMIN — Medication 1 MCG/KG/HR: at 05:09

## 2018-01-01 RX ADMIN — IPRATROPIUM BROMIDE 0.5 MG: 0.5 SOLUTION RESPIRATORY (INHALATION) at 07:12

## 2018-01-01 RX ADMIN — HYDROCORTISONE SODIUM SUCCINATE 50 MG: 100 INJECTION, POWDER, FOR SOLUTION INTRAMUSCULAR; INTRAVENOUS at 17:42

## 2018-01-01 RX ADMIN — FUROSEMIDE 120 MG: 10 INJECTION, SOLUTION INTRAMUSCULAR; INTRAVENOUS at 10:17

## 2018-01-01 RX ADMIN — LEVALBUTEROL HYDROCHLORIDE 1.25 MG: 1.25 SOLUTION, CONCENTRATE RESPIRATORY (INHALATION) at 07:58

## 2018-01-01 RX ADMIN — FENTANYL CITRATE 50 MCG: 50 INJECTION, SOLUTION INTRAMUSCULAR; INTRAVENOUS at 20:41

## 2018-01-01 RX ADMIN — HYDROCORTISONE SODIUM SUCCINATE 50 MG: 100 INJECTION, POWDER, FOR SOLUTION INTRAMUSCULAR; INTRAVENOUS at 11:05

## 2018-01-01 RX ADMIN — NICOTINE 21 MG: 21 PATCH, EXTENDED RELEASE TRANSDERMAL at 08:48

## 2018-01-01 RX ADMIN — CEFEPIME HYDROCHLORIDE 2000 MG: 2 INJECTION, POWDER, FOR SOLUTION INTRAVENOUS at 19:55

## 2018-01-01 RX ADMIN — LEVALBUTEROL HYDROCHLORIDE 1.25 MG: 1.25 SOLUTION, CONCENTRATE RESPIRATORY (INHALATION) at 13:26

## 2018-01-01 RX ADMIN — CHLORHEXIDINE GLUCONATE 0.12% ORAL RINSE 15 ML: 1.2 LIQUID ORAL at 20:01

## 2018-01-01 RX ADMIN — Medication 0.04 UNITS/MIN: at 06:03

## 2018-12-18 PROBLEM — R77.8 ELEVATED TROPONIN: Status: ACTIVE | Noted: 2018-01-01

## 2018-12-18 PROBLEM — E16.2 HYPOGLYCEMIA: Status: ACTIVE | Noted: 2018-01-01

## 2018-12-18 PROBLEM — F17.200 HEAVY TOBACCO SMOKER: Status: ACTIVE | Noted: 2018-01-01

## 2018-12-18 PROBLEM — I48.91 A-FIB (HCC): Status: ACTIVE | Noted: 2018-01-01

## 2018-12-18 PROBLEM — R40.0 SOMNOLENCE: Status: ACTIVE | Noted: 2018-01-01

## 2018-12-18 PROBLEM — E11.9 DIABETES MELLITUS (HCC): Status: ACTIVE | Noted: 2018-01-01

## 2018-12-18 PROBLEM — R57.9 SHOCK (HCC): Status: ACTIVE | Noted: 2018-01-01

## 2018-12-18 PROBLEM — T68.XXXA HYPOTHERMIA: Status: ACTIVE | Noted: 2018-01-01

## 2018-12-18 NOTE — H&P
History and Physical - Critical Care   Nolon Meckel 80 y o  male MRN: [id number]  Unit/Bed#:  Encounter: [id number]    Reason for Admission / Chief Complaint:  Worsening shortness of breath with altered mental status    History of Present Illness:  Nolon Meckel is a 80 y o  male with a past medical history of diabetes, hypertension, suspect heart failure due to diuretic home use, and tobacco abuse who presents to the emergency room via EMS  Per patient's wife, patient started with shortness of breath that has been worsening over the past few weeks  Patient has always had a cough but wife noticed increased sputum production  The swelling in his legs had been increased  During this time, per his wife, he had called his PCP and had his dose of torsemide increased  This morning, wife found patient unresponsive and called 911  He was found to be hypoglycemic and received 25g of D10  He was also hypoxic in the 70s which improved to the 90s with albuterol treatment and face mask  In the ED, vital signs 91F, 86, 20, 77/43, 91% on NC  He received 3L of NSS and started on peripheral Levophed  Received ceftriaxone 1 gm and vancomycin 2g  Blood glucose was < 20 in which he received 4 amps of D50 and started on D10 IVF at 100 ml/hr  Due to his work of breathing, he was placed on BiPAP  Bear hugger applied  Arrived to the ICU on BiPAP  Central line was placed and confirmed by CXR  History obtained from patient and his wife  Also per chart review      Past Medical History:  Past Medical History:   Diagnosis Date    Diabetes mellitus ([facility] [address] )     Type 2    Gout     Hypertension        Past Surgical History: Unable to obtain due to patient on BiPAP mask    Past Family History: Unable to obtain due to patient on BiPAP mask    Social History:  History   Smoking Status    Current Every Day Smoker    Packs/day: 1 00   Smokeless Tobacco    Current User    Types: Chew     History   Alcohol Use No     History Drug Use No     Marital Status:     Medications:  Current Facility-Administered Medications   Medication Dose Route Frequency    chlorhexidine (PERIDEX) 0 12 % oral rinse 15 mL  15 mL Swish & Spit Q12H [address]    dextrose infusion 10 %  100 mL/hr Intravenous Continuous    heparin (porcine) subcutaneous injection 7,500 Units  7,500 Units Subcutaneous Q8H [address]    norepinephrine (LEVOPHED) 8 mg (DOUBLE CONCENTRATION) IV in sodium chloride 0 9% 250 mL  1-30 mcg/min Intravenous Titrated     Home medications:  Prior to Admission medications    Medication Sig Start Date End Date Taking? Authorizing Provider   allopurinol (ZYLOPRIM) 300 mg tablet Take 300 mg by mouth daily   Yes Historical Provider, MD   lisinopril (ZESTRIL) 10 mg tablet Take 10 mg by mouth daily   Yes Historical Provider, MD   metFORMIN (GLUCOPHAGE) 500 mg tablet Take 500 mg by mouth 2 (two) times a day with meals   Yes Historical Provider, MD     Allergies:  No Known Allergies    ROS:   Review of Systems   Unable to perform ROS: Acuity of condition       Vitals:  Vitals:    18 1300 18 1315 18 1345 18 1400   BP: 147/93 107/78 (!) 85/48 152/94   BP Location:       Pulse: 81 87 83 86   Resp: (!) 29 (!) 28 (!) 29 (!) 30   Temp: (!) 95 9 °F (35 5 °C) (!) 96 1 °F (35 6 °C) (!) 96 4 °F (35 8 °C) (!) 96 6 °F (35 9 °C)   TempSrc:       SpO2: 94% 94% 93% 93%   Weight:       Height:         Temperature:   Temp (24hrs), Av 3 °F (34 6 °C), Min:91 °F (32 8 °C), Max:96 6 °F (35 9 °C)    Current Temperature: (!) 96 6 °F (35 9 °C)    Weights:   IBW: 68 4 kg  Body mass index is 43 95 kg/m²  Hemodynamic Monitoring:  N/A     Non-Invasive/Invasive Ventilation Settings:  Respiratory    Lab Data (Last 4 hours)    None         O2/Vent Data (Last 4 hours)       1127          Non-Invasive Ventilation Mode BiPAP                 SpO2: SpO2: 95 %     Physical Exam:  Physical Exam   Constitutional: He appears lethargic  He is cooperative   He has a sickly appearance  He appears distressed  HENT:   Head: Normocephalic and atraumatic  Eyes: Right eye exhibits no discharge  Left eye exhibits no discharge  Neck: Neck supple  JVD present  No tracheal deviation present  Cardiovascular: An irregular rhythm present  No murmur heard  Pulses:       Radial pulses are 2+ on the right side, and 2+ on the left side  Dorsalis pedis pulses are 1+ on the right side, and 1+ on the left side  Pulmonary/Chest: No stridor  He is in respiratory distress  He has wheezes  He exhibits no tenderness  Abdominal: Bowel sounds are normal  He exhibits distension  There is no tenderness  Musculoskeletal: He exhibits edema  Neurological: He has normal strength and normal reflexes  He appears lethargic  GCS eye subscore is 4  GCS verbal subscore is 5  GCS motor subscore is 6     Skin:   Bilateral leg wounds       Labs:    Results from last 7 days  Lab Units 12/18/18 0641 12/18/18 0638 12/18/18 0637   WBC Thousand/uL 4 68  --   --    HEMOGLOBIN g/dL 17 3*  --   --    I STAT HEMOGLOBIN g/dl  --  18 4* 18 4*   HEMATOCRIT % 48 1  --   --    HEMATOCRIT, ISTAT %  --  54* 54*   PLATELETS Thousands/uL 211  --   --    NEUTROS PCT % 81*  --   --    MONOS PCT % 7  --   --        Results from last 7 days  Lab Units 12/18/18 0641 12/18/18 0638 12/18/18 0637   SODIUM mmol/L 140  --   --    POTASSIUM mmol/L 4 0  --   --    CHLORIDE mmol/L 100  --   --    CO2 mmol/L 30  --   --    CO2, I-STAT mmol/L  --  32 35*   AGAP mmol/L  --  13  --    ANION GAP mmol/L 10  --   --    BUN mg/dL 84*  --   --    CREATININE mg/dL 1 57*  --   --    CALCIUM mg/dL 8 5  --   --    ALT U/L 48  --   --    AST U/L 60*  --   --    ALK PHOS U/L 160*  --   --    ALBUMIN g/dL 2 7*  --   --    TOTAL BILIRUBIN mg/dL 0 80  --   --             Results from last 7 days  Lab Units 12/18/18 0641   INR  1 16   PTT seconds 31       Results from last 7 days  Lab Units 12/18/18 0641   TROPONIN I ng/mL 0 04 Results from last 7 days  Lab Units 12/18/18  0641   LACTIC ACID mmol/L 1 7             Imaging:  I have personally reviewed pertinent reports  CXR show right IJ with tip in region of SVC, no pneumothorax, patchy bilateral airspace opacities suspicious for pulmonary edema vs multifocal pna, bilateral pleural effusions and cardiomegaly - read by radiologist   EKG: This was personally reviewed by myself  Atrial fibrillation with rate 70  Micro: Pending Bcx        ______________________________________________________________________    Assessment:  Principal Problem:    Shock ([facility] [address] )  Active Problems:    Hypoglycemia    Heavy tobacco smoker    Hypothermia    Somnolence    Di[facility] mellitus ([facility] )    Elevated troponin        Plan:     Neuro: Cont neuro checks and CAM-ICU  Sleep promotion and delirium prevention  CV: Cont telemonitoring, Afib with rates 80s-110s  Hold lisinopril  Continue on Levophed drip and titrate for MAP > 65  Consider Lasix  Pulm: Monitor saturations and keep > 92%  Continue BiPAP and wean to NC as tolerated  GI: Keep NPO while on BiPAP  : Dominguez intact, provide dominguez catheter care  Monitor I's and O's and kidney function  Trend electrolytes and replete as necessary  ID: Hypothermic on arrival, temperature now normal on bear hugger  WBC 4 68, trend for now  Consider CAP and possible atbx  Heme: Stable  Trend and transfuse if < 7 0  Endo: Hold metformin  Continue to monitor Accucheck and continue D10 100ml/hr  Additional amps of dextrose for BS < 70  Consider thyroid studies  Msk/Skin: Hold allopurinol  Activity as tolerated  Disposition: ICU for close monitoring and management of blood pressure and respiratory status while on BiPAP     Counseling / Coordination of Care  Total Critical Care time spent 60 minutes excluding procedures, teaching and family updates        ______________________________________________________________________    VTE Pharmacologic Prophylaxis: Heparin  VTE Mechanical Prophylaxis: sequential compression device    Invasive lines and devices: Invasive Devices     Central Venous Catheter Line            CVC Central Lines 12/18/18 Triple 16cm less than 1 day          Peripheral Intravenous Line            Peripheral IV 12/18/18 Left Antecubital less than 1 day    Peripheral IV 12/18/18 Left Forearm less than 1 day    Peripheral IV 12/18/18 Right Wrist less than 1 day          Drain            Urethral Catheter Temperature probe 16 Fr  less than 1 day                Code Status: Level 1 - Full Code  POA:    POLST:      Given critical illness, patient length of stay will require greater than two midnights  Portions of the record may have been created with voice recognition software  Occasional wrong word or "sound a like" substitutions may have occurred due to the inherent limitations of voice recognition software  Read the chart carefully and recognize, using context, where substitutions have occurred        KIKA Piper

## 2018-12-18 NOTE — PROCEDURES
Central Line Insertion  Date/Time: 12/18/2018 11:01 AM  Performed by: Huma Sepulveda by: Farzana Colón     Patient location:  Bedside  Other Assisting Provider: Yes (comment) (Sanya Jain)    Consent:     Consent obtained:  Written (per discussion by ED physician)    Consent given by:  Patient and spouse  Universal protocol:     Required blood products, implants, devices, and special equipment available: yes      Immediately prior to procedure, a time out was called: yes      Patient identity confirmed:  Arm band  Pre-procedure details:     Hand hygiene: Hand hygiene performed prior to insertion      Sterile barrier technique: All elements of maximal sterile technique followed      Skin preparation:  ChloraPrep  Indications:     Central line indications: medications requiring central line    Sedation:     Sedation type: Anxiolysis (midazolam 2 mg IV)  Procedure details:     Location:  Right internal jugular    Vessel type: vein      Laterality:  Right    Approach: percutaneous technique used      Patient position:  Reverse Trendelenburg    Catheter type:  Triple lumen 16cm    Catheter size:  7 5 Fr    Ultrasound guidance: yes      Sterile ultrasound techniques: Sterile gel and sterile probe covers were used      Number of attempts:  1    Successful placement: yes      Vessel of catheter tip end:  16 cm from skin  Post-procedure details:     Post-procedure:  Dressing applied and line sutured    Assessment:  Blood return through all ports and free fluid flow (CXR pending)    Post-procedure complications: none      Patient tolerance of procedure:   Tolerated well, no immediate complications

## 2018-12-18 NOTE — ED PROVIDER NOTES
History  Chief Complaint   Patient presents with    Hypoglycemia - Symptomatic     Pt arrives via EMS with reports of Hypoglycemia, BS was 55 per EMS, given 25g of D10  EMS reports Pt was apneic with crakles at the bases  Pt BS in triage was <20, Provider aware  27-year-old male, multiple medical problems, change in mental status over the past few days as per wife  , today virtually in coherent  Prompting 911 call  Found to be hypoglycemic, given IV glucose which improved his mental status  Patient states he spends most this time sitting in a chair, has not move much specially in the past few months  Patient admits to noncompliance with medications in that when he feels his blood sugar up he takes a few blood pressure pills same with his metformin and same with it is 2 0 some eyed  Patient states he takes anywhere from 1-4 of these pills a day  Patient is not sure how her actually supposed to be prescribed  Patient does have a significant cough  This is been going on for the past few weeks  As per paramedics he was hypoxic in the 70s upon their arrival increase to 90s with an albuterol treatment  With 15 L through the face mask        History provided by:  Patient, EMS personnel and spouse  History limited by:  Mental status change  Hypoglycemia - Symptomatic   Initial blood sugar:  20  Blood sugar after intervention:  103  Severity:  Severe  Onset quality:  Unable to specify  Duration:  2 days  Timing:  Intermittent  Progression:  Waxing and waning  Chronicity:  Recurrent  Diabetic status:  Controlled with oral medications  Current diabetic therapy:  Metformin  Context: treatment noncompliance    Relieved by:  IV glucose  Associated symptoms: altered mental status, decreased responsiveness and shortness of breath        Prior to Admission Medications   Prescriptions Last Dose Informant Patient Reported?  Taking?   allopurinol (ZYLOPRIM) 300 mg tablet   Yes Yes   Sig: Take 300 mg by mouth daily lisinopril (ZESTRIL) 10 mg tablet   Yes Yes   Sig: Take 10 mg by mouth daily   metFORMIN (GLUCOPHAGE) 500 mg tablet   Yes Yes   Sig: Take 500 mg by mouth 2 (two) times a day with meals      Facility-Administered Medications: None       Past Medical History:   Diagnosis Date    Diabetes mellitus ([facility] [address] )     Type 2    Gout     Hypertension        History reviewed  No pertinent surgical history  History reviewed  No pertinent family history  I have reviewed and agree with the history as documented  Social History   Substance Use Topics    Smoking status: Current Every Day Smoker     Packs/day: 1 00    Smokeless tobacco: Current User     Types: Chew    Alcohol use No        Review of Systems   Unable to perform ROS: Acuity of condition   Constitutional: Positive for activity change and decreased responsiveness  Negative for fever  Respiratory: Positive for shortness of breath and wheezing  Physical Exam  Physical Exam   Constitutional: He is oriented to person, place, and time  He appears distressed  HENT:   Head: Normocephalic and atraumatic  Eyes: Pupils are equal, round, and reactive to light  Neck: Normal range of motion  Neck supple  No tracheal deviation present  Cardiovascular: Normal rate, regular rhythm, normal heart sounds and intact distal pulses  No murmur heard  Pulmonary/Chest: No stridor  He is in respiratory distress  He has wheezes  He has rales  Abdominal: Soft  He exhibits no distension  There is no tenderness  There is no rebound and no guarding  Musculoskeletal: Normal range of motion  Neurological: He is alert and oriented to person, place, and time  Skin: Skin is warm and dry  He is not diaphoretic  No erythema  No pallor  Multiple areas of skin breakdown over buttocks sacrum and ischium    Chronic weeping open wounds bilateral lower extremities  Psychiatric: He has a normal mood and affect  Vitals reviewed        Vital Signs  ED Triage Vitals Temperature Pulse Respirations Blood Pressure SpO2   12/18/18 0637 12/18/18 0629 12/18/18 0629 12/18/18 0631 12/18/18 0629   (!) 91 °F (32 8 °C) 85 20 (!) 194/143 90 %      Temp Source Heart Rate Source Patient Position - Orthostatic VS BP Location FiO2 (%)   12/18/18 0637 12/18/18 0629 12/18/18 0629 12/18/18 0629 12/18/18 1127   Rectal Monitor Lying Right arm 60      Pain Score       12/18/18 0837       6           Vitals:    12/22/18 1300 12/22/18 1400 12/22/18 1500 12/22/18 1506   BP:    91/57   Pulse: 98 92 100 98   Patient Position - Orthostatic VS:    Lying       Visual Acuity  Visual Acuity      Most Recent Value   L Pupil Size (mm)  2   R Pupil Size (mm)  2   L Pupil Shape  Round   R Pupil Shape  Round          ED Medications  Medications   albuterol inhalation solution 2 5 mg (not administered)   insulin lispro (HumaLOG) 100 units/mL subcutaneous injection 2-12 Units (2 Units Subcutaneous Not Given 12/22/18 1259)   norepinephrine (LEVOPHED) 8 mg (DOUBLE CONCENTRATION) IV in sodium chloride 0 9% 250 mL (11 mcg/min Intravenous Rate/Dose Change 12/22/18 1107)   nystatin (MYCOSTATIN) powder ( Topical Given 12/22/18 1004)   pneumococcal 13-valent conjugate vaccine (PREVNAR-13) IM injection 0 5 mL (not administered)   vasopressin (PITRESSIN) 20 Units in sodium chloride 0 9 % 100 mL infusion (0 04 Units/min Intravenous New Bag 12/21/18 1537)   nicotine (NICODERM CQ) 7 mg/24hr TD 24 hr patch 7 mg (7 mg Transdermal Medication Applied 12/22/18 1029)   levalbuterol (XOPENEX) inhalation solution 1 25 mg (1 25 mg Nebulization Given 12/22/18 1408)   ipratropium (ATROVENT) 0 02 % inhalation solution 0 5 mg (0 5 mg Nebulization Given 12/22/18 1408)   metroNIDAZOLE (FLAGYL) IVPB (premix) 500 mg (500 mg Intravenous New Bag 12/22/18 1047)   chlorhexidine (PERIDEX) 0 12 % oral rinse 15 mL (15 mL Swish & Spit Given 12/22/18 1005)   fentanyl citrate (PF) 100 MCG/2ML 50 mcg (50 mcg Intravenous Given 12/22/18 4560) hydrocortisone sodium succinate (PF) (Solu-CORTEF) injection 50 mg (50 mg Intravenous Given 12/22/18 1105)   senna-docusate sodium (SENOKOT S) 8 6-50 mg per tablet 1 tablet (1 tablet Oral Given 12/22/18 1005)   cefepime (MAXIPIME) 2,000 mg in dextrose 5 % 50 mL IVPB (2,000 mg Intravenous New Bag 12/22/18 1000)   ascorbic acid 1,500 mg in sodium chloride 0 9 % 100 mL IVPB (1,500 mg Intravenous New Bag 12/22/18 1537)   thiamine (VITAMIN B1) 200 mg in sodium chloride 0 9 % 50 mL IVPB (200 mg Intravenous New Bag 12/22/18 0914)   acetaminophen (TYLENOL) tablet 650 mg (325 mg Oral Given 12/22/18 1046)   lidocaine (PF) (XYLOCAINE-MPF) 2 % injection **ADS Override Pull** (not administered)   omeprazole (PRILOSEC) suspension 2 mg/mL (20 mg Oral Given 12/22/18 1005)   vancomycin (VANCOCIN) IVPB (premix) 1,000 mg (not administered)   fentaNYL 1250 mcg in sodium chloride 0 9% 125mL drip (not administered)   dextrose 50 % IV solution 50 mL (50 mL Intravenous Given 12/18/18 0631)   sodium chloride 0 9 % bolus 1,000 mL (0 mL Intravenous Stopped 12/18/18 0742)   sodium chloride 0 9 % bolus 1,000 mL (0 mL Intravenous Stopped 12/18/18 0741)   ceftriaxone (ROCEPHIN) 1 g/50 mL in dextrose IVPB (0 mg Intravenous Stopped 12/18/18 0741)   vancomycin (VANCOCIN) 2,000 mg in sodium chloride 0 9 % 500 mL IVPB (0 mg Intravenous Stopped 12/18/18 1011)   sodium chloride 0 9 % bolus 1,000 mL (0 mL Intravenous Stopped 12/18/18 0810)   dextrose 50 % IV solution 50 mL (50 mL Intravenous Given 12/18/18 0747)   dextrose 50 % IV solution 50 mL (50 mL Intravenous Given 12/18/18 0750)   midazolam (VERSED) injection 2 mg (2 mg Intravenous Given 12/18/18 1046)   dextrose 50 % IV solution 50 mL (50 mL Intravenous Given 12/18/18 1132)   furosemide (LASIX) injection 60 mg (60 mg Intravenous Given 12/18/18 1721)   lidocaine (PF) (XYLOCAINE-MPF) 1 % injection **ADS Override Pull** (50 mg  Given 12/18/18 1721)   lidocaine (PF) (XYLOCAINE-MPF) 1 % injection **ADS Override Pull** (10 mg  Given 12/18/18 1927)   furosemide (LASIX) injection 40 mg (40 mg Intravenous Given 12/19/18 1617)   furosemide (LASIX) injection 60 mg (60 mg Intravenous Given 12/20/18 0844)   albumin human (FLEXBUMIN) 25 % injection 25 g (0 g Intravenous Stopped 12/20/18 1630)   furosemide (LASIX) injection 80 mg (80 mg Intravenous Given 12/20/18 1618)   furosemide (LASIX) 100 mg in dextrose 5 % 50 mL IVPB (0 mg Intravenous Stopped 12/20/18 2316)   hydrocortisone sodium succinate (PF) (Solu-CORTEF) injection 100 mg (100 mg Intravenous Given 12/20/18 2338)   albumin human (FLEXBUMIN) 5 % injection 12 5 g (0 g Intravenous Stopped 12/21/18 0017)   multi-electrolyte (ISOLYTE-S PH 7 4) bolus 1,000 mL (0 mL Intravenous Stopped 12/21/18 0236)   vancomycin (VANCOCIN) 2,000 mg in sodium chloride 0 9 % 500 mL IVPB (0 mg Intravenous Stopped 12/21/18 0504)   multi-electrolyte (ISOLYTE-S PH 7 4) bolus 500 mL (0 mL Intravenous Stopped 12/21/18 0444)   multi-electrolyte (ISOLYTE-S PH 7 4) bolus 500 mL (500 mL Intravenous New Bag 12/21/18 1357)   acetaminophen (TYLENOL) oral suspension 650 mg (650 mg Oral Given 12/21/18 2147)   ibuprofen (MOTRIN) oral suspension 600 mg (600 mg Oral Given 12/22/18 0124)   furosemide (LASIX) 120 mg in dextrose 5 % 50 mL IVPB (0 mg Intravenous Stopped 12/22/18 1200)   heparin (porcine) injection 4,000 Units (4,000 Units Intravenous Given 12/22/18 1026)       Diagnostic Studies  Results Reviewed     Procedure Component Value Units Date/Time    Blood culture #2 [[id number]] Collected:  12/18/18 0706    Lab Status:  Preliminary result Specimen:  Blood from Arm, Left Updated:  12/22/18 1301     Blood Culture No Growth After 4 Days  Blood culture #1 [[id number]] Collected:  12/18/18 0706    Lab Status:  Preliminary result Specimen:  Blood from Arm, Right Updated:  12/22/18 1001     Blood Culture No Growth After 4 Days      TSH, 3rd generation [[id number]]  (Abnormal) Collected:  12/18/18 5413 Lab Status:  Final result Specimen:  Blood from Arm, Right Updated:  12/18/18 1724     TSH 3RD GENERATON 3 907 (H) uIU/mL     Narrative:         Patients undergoing fluorescein dye angiography may retain small amounts of fluorescein in the body for 48-72 hours post procedure  Samples containing fluorescein can produce falsely depressed TSH values  If the patient had this procedure,a specimen should be resubmitted post fluorescein clearance      Urine Microscopic [[id number]]  (Abnormal) Collected:  12/18/18 0820    Lab Status:  Final result Specimen:  Urine from Urine, Indwelling Henson Catheter Updated:  12/18/18 0912     RBC, UA 1-2 (A) /hpf      WBC, UA 2-4 (A) /hpf      Epithelial Cells Occasional /hpf      Bacteria, UA Occasional /hpf      Hyaline Casts, UA 1-2 (A) /lpf      AMORPH URATES Moderate /hpf     UA w Reflex to Microscopic w Reflex to Culture [[id number]]  (Abnormal) Collected:  12/18/18 0820    Lab Status:  Final result Specimen:  Urine from Urine, Indwelling Henson Catheter Updated:  12/18/18 0845     Color, UA Yellow     Clarity, UA Slightly Cloudy     Specific Gravity, UA >=1 030     pH, UA 5 0     Leukocytes, UA Negative     Nitrite, UA Negative     Protein, UA 30 (1+) (A) mg/dl      Glucose, UA Negative mg/dl      Ketones, UA Trace (A) mg/dl      Urobilinogen, UA 0 2 E U /dl      Bilirubin, UA Moderate (A)     Blood, UA Moderate (A)    Fingerstick Glucose (POCT) [[id number]]  (Normal) Collected:  12/18/18 0843    Lab Status:  Final result Updated:  12/18/18 0844     POC Glucose 95 mg/dl     Fingerstick Glucose (POCT) [[id number]]  (Abnormal) Collected:  12/18/18 0742    Lab Status:  Final result Updated:  12/18/18 0744     POC Glucose 48 (L) mg/dl     Basic metabolic panel [[id number]]  (Abnormal) Collected:  12/18/18 0641    Lab Status:  Final result Specimen:  Blood from Arm, Right Updated:  12/18/18 0716     Sodium 140 mmol/L      Potassium 4 0 mmol/L      Chloride 100 mmol/L      CO2 30 mmol/L ANION GAP 10 mmol/L      BUN 84 (H) mg/dL      Creatinine 1 57 (H) mg/dL      Glucose 19 (LL) mg/dL      Calcium 8 5 mg/dL      eGFR 45 ml/min/1 73sq m     Narrative:         National Kidney Disease Education Program recommendations are as follows:  GFR calculation is accurate only with a steady state creatinine  Chronic Kidney disease less than 60 ml/min/1 73 sq  meters  Kidney failure less than 15 ml/min/1 73 sq  meters  Hepatic function panel [[id number]]  (Abnormal) Collected:  12/18/18 0641    Lab Status:  Final result Specimen:  Blood from Arm, Right Updated:  12/18/18 0716     Total Bilirubin 0 80 mg/dL      Bilirubin, Direct 0 55 (H) mg/dL      Alkaline Phosphatase 160 (H) U/L      AST 60 (H) U/L      ALT 48 U/L      Total Protein 6 3 (L) g/dL      Albumin 2 7 (L) g/dL     Lipase [[id number]]  (Abnormal) Collected:  12/18/18 0641    Lab Status:  Final result Specimen:  Blood from Arm, Right Updated:  12/18/18 0716     Lipase 494 (H) u/L     B-type natriuretic peptide [[id number]]  (Abnormal) Collected:  12/18/18 0641    Lab Status:  Final result Specimen:  Blood from Arm, Right Updated:  12/18/18 0716     NT-proBNP 8,391 (H) pg/mL     Lactic acid, plasma [[id number]]  (Normal) Collected:  12/18/18 0641    Lab Status:  Final result Specimen:  Blood from Arm, Right Updated:  12/18/18 0711     LACTIC ACID 1 7 mmol/L     Narrative:         Result may be elevated if tourniquet was used during collection      Troponin I [[id number]]  (Normal) Collected:  12/18/18 0641    Lab Status:  Final result Specimen:  Blood from Arm, Right Updated:  12/18/18 0710     Troponin I 0 04 ng/mL     Protime-INR [[id number]]  (Abnormal) Collected:  12/18/18 0641    Lab Status:  Final result Specimen:  Blood from Arm, Right Updated:  12/18/18 0709     Protime 14 5 (H) seconds      INR 1 16    APTT [[id number]]  (Normal) Collected:  12/18/18 0641    Lab Status:  Final result Specimen:  Blood from Arm, Right Updated:  12/18/18 2191 PTT 31 seconds     CBC and differential [[id number]]  (Abnormal) Collected:  12/18/18 0641    Lab Status:  Final result Specimen:  Blood from Arm, Right Updated:  12/18/18 0648     WBC 4 68 Thousand/uL      RBC 5 28 Million/uL      Hemoglobin 17 3 (H) g/dL      Hematocrit 48 1 %      MCV 91 fL      MCH 32 8 pg      MCHC 36 0 g/dL      RDW 18 5 (H) %      MPV 10 7 fL      Platelets 092 Thousands/uL      nRBC 0 /100 WBCs      Neutrophils Relative 81 (H) %      Immat GRANS % 0 %      Lymphocytes Relative 11 (L) %      Monocytes Relative 7 %      Eosinophils Relative 1 %      Basophils Relative 0 %      Neutrophils Absolute 3 76 Thousands/µL      Immature Grans Absolute 0 01 Thousand/uL      Lymphocytes Absolute 0 52 (L) Thousands/µL      Monocytes Absolute 0 31 Thousand/µL      Eosinophils Absolute 0 06 Thousand/µL      Basophils Absolute 0 02 Thousands/µL     POCT Chem 8+ [[id number]]  (Abnormal) Collected:  12/18/18 0638    Lab Status:  Final result Updated:  12/18/18 0644     SODIUM, I-STAT 138 mmol/l      Potassium, i-STAT 3 9 mmol/L      Chloride, istat 97 (L) mmol/L      CO2, i-STAT 32 mmol/L      Anion Gap, i-STAT 13 mmol/L      Calcium, Ionized i-STAT 1 06 (L) mmol/L      BUN, I-STAT 67 (H) mg/dl      Creatinine, i-STAT 1 2 mg/dl      eGFR 62 ml/min/1 73sq m      Glucose, i-STAT 21 (LL) mg/dl      Hct, i-STAT 54 (H) %      Hgb, i-STAT 18 4 (H) g/dl      Specimen Type VENOUS    POCT Blood Gas (CG8+) [[id number]]  (Abnormal) Collected:  12/18/18 0637    Lab Status:  Final result Updated:  12/18/18 0641     ph, Marcos ISTAT 7 307     pCO2, Marcos i-STAT 66 3 (H) mm HG      pO2, Marcos i-STAT 39 0 mm HG      BE, i-STAT 4 (H) mmol/L      HCO3, Marcos i-STAT 33 2 (H) mmol/L      CO2, i-STAT 35 (H) mmol/L      O2 Sat, i-STAT 66 (L) %      SODIUM, I-STAT 139 mmol/l      Potassium, i-STAT 4 0 mmol/L      Calcium, Ionized i-STAT 1 07 (L) mmol/L      Hct, i-STAT 54 (H) %      Hgb, i-STAT 18 4 (H) g/dl      Glucose, i-STAT <20 (LL) mg/dl Specimen Type VENOUS    Fingerstick Glucose (POCT) [[id number]]  (Normal) Collected:  12/18/18 0640    Lab Status:  Final result Updated:  12/18/18 0641     POC Glucose 102 mg/dl     Fingerstick Glucose (POCT) [[id number]]  (Abnormal) Collected:  12/18/18 0630    Lab Status:  Final result Updated:  12/18/18 0630     POC Glucose <20 (LL) mg/dl                  CT chest abdomen pelvis wo contrast   Final Result by Melonie Oconnor MD (12/22 1309)      Large amount of ascites with the moderate large right effusion and small left effusion  Pneumoperitoneum, etiology of which is unclear   Anasarca   Cirrhotic liver   No acute consolidation seen   Bibasilar density seen suggesting atelectasis   I personally discussed this study with Tyson Avila, on 12/22/2018 at 1:05 PM             2      Workstation performed: [id number]         XR chest portable ICU   Final Result by Chi Tan MD (12/21 1516)      1  Persistent cardiomegaly  2   Increased retrocardiac opacity possibly pneumonia versus atelectasis and/or effusion versus a combination of these possibilities  3   Layering right pleural effusion suspected with mild to moderate pulmonary interstitial edema  Workstation performed: [id number]         XR chest portable ICU   Final Result by Saranya Dawson DO (12/21 8922)      Lines and tubes as above  No pneumothorax  Persistent central vascular congestion with bilateral pleural effusions and left basilar volume loss  Resolution of left apical density  Workstation performed: [id number]         XR chest portable ICU   Final Result by Saranya Dawson DO (12/21 9336)      Lines and tubes as above  New increased density in the left upper lung zone could represent atelectasis, infiltrate or loculated pleural fluid felt less likely  Persistent central vascular congestion with pleural effusions and basilar volume loss more so on the left           Workstation performed: [id number] XR chest portable   Final Result by Meet Barcenas DO (12/20 1146)      Improving perihilar and basilar opacities suggestive of pulmonary edema  Workstation performed: [id number]         XR chest portable ICU   Final Result by Elda Nelson DO (12/19 1758)      No significant change  Workstation performed: [id number]         XR chest portable ICU   Final Result by Elda Nelson DO (12/19 1022)      Persistent central vascular congestion with bilateral pleural effusions and left basilar volume loss  No pneumothorax  Workstation performed: [id number]         XR chest portable ICU   Final Result by Jace Modi MD (12/18 1438)   1  Patchy bilateral airspace opacities suspicious for pulmonary edema versus multifocal pneumonia  2   Bilateral pleural effusions and cardiomegaly  Workstation performed: [id number]         XR chest 1 view portable   ED Interpretation by Sia De Leon DO (12/18 6378)   Bilateral pleural effusions right greater than left      Final Result by Esperanza Leigh MD (12/18 7188)      Enlargement of cardiac silhouette with right pleural effusion and probable small left effusion        Right basilar compressive atelectasis or infiltrate       Workstation performed: [id number]         VAS lower limb venous duplex study, complete bilateral    (Results Pending)   XR chest portable    (Results Pending)              Procedures  ECG 12 Lead Documentation  Date/Time: 12/18/2018 7:07 AM  Performed by: Justine Landau by: Otilia Scott     ECG reviewed by me, the ED Provider: yes    Patient location:  ED  Interpretation:     Interpretation: normal    Rate:     ECG rate:  69    ECG rate assessment: normal    Rhythm:     Rhythm: atrial fibrillation    Ectopy:     Ectopy: PVCs      PVCs:  Frequent  QRS:     QRS axis:  Normal    QRS intervals:  Normal  Conduction:     Conduction: normal    ST segments:     ST segments:  Normal  T waves:     T waves: normal               Phone Contacts  ED Phone Contact    ED Course  ED Course as of Dec 22 1552   Tue Dec 18, 2018   0701 Patient's work of breathing increasing all pressure backing in IV fluids  , placing patient on BiPAP     7358 Patient with significantly elevated BNP  Will dose fluids based on ideal body weight  Patient's blood pressure steadily improving     0739 Patient's blood pressure decreasing again  , now [de-identified] systolic early  Will infuse 3rd L if still hypotensive after this will start Levophed    0744 Patient currently hypotensive  Will give another bolus of D50, change D5 half-normal saline to D 10 W                          Initial Sepsis Screening     Row Name 12/18/18 0720                Is the patient's history suggestive of a new or worsening infection? (!)  Yes (Proceed)  -DA        Suspected source of infection soft tissue  -DA        Are two or more of the following signs & symptoms of infection both present and new to the patient? (!)  Yes (Proceed)  -DA        Indicate SIRS criteria Altered mental status; Hypothermia < 36C (96 8F)  -DA        If the answer is yes to both questions, suspicion of sepsis is present          If severe sepsis is present AND tissue hypoperfusion perists in the hour after fluid resuscitation or lactate > 4, the patient meets criteria for SEPTIC SHOCK          Are any of the following organ dysfunction criteria present within 6 hours of suspected infection and SIRS criteria that are NOT considered to be chronic conditions? (!)  Yes  -DA        Organ dysfunction SBP < 90 mmHg  -DA        Date of presentation of severe sepsis 12/18/18  -DA        Time of presentation of severe sepsis 0721  -DA        Tissue hypoperfusion persists in the hour after crystalloid fluid administration, evidenced, by either:          Was hypotension present within one hour of the conclusion of crystalloid fluid administration?           Date of presentation of septic shock   Time of presentation of septic shock            User Key  (r) = Recorded By, (t) = Taken By, (c) = Cosigned By    Initials Name Provider Type    DERIC White DO Physician                  MDM  Number of Diagnoses or Management Options  Bilateral pleural effusion: new and requires workup  Hypoglycemia: new and requires workup  Hypotension: new and requires workup  Hypothermia, initial encounter: new and requires workup  Lower extremity cellulitis: new and requires workup  New onset atrial fibrillation [facility]): new and requires workup  Pressure ulcer: new and requires workup  Severe sepsis [facility]): new and requires workup  Diagnosis management comments:  Patient presented hypothermic hypotensive hypoglycemic , given D50 to correct the hypoglycemia placed on a Sae Hugger to aid in correcting they hypothermia  Patient's hypotension treated with IV fluids, patient highly full body fluid overloaded, weeping lower extremity edema, anasarca  Patient given fluids due to ideal body weight, started on ceftriaxone and vancomycin for lower extremity cellulitis and questionable right lower lobe pneumonia, placed on BiPAP due to increased work of breathing  Patient admitted to ICU       Amount and/or Complexity of Data Reviewed  Clinical lab tests: ordered and reviewed  Tests in the radiology section of CPT®: ordered and reviewed  Decide to obtain previous medical records or to obtain history from someone other than the patient: yes  Obtain history from someone other than the patient: yes  Review and summarize past medical records: yes  Discuss the patient with other providers: yes  Independent visualization of images, tracings, or specimens: yes      The patient presented with a condition in which there was a high probability of imminent or life-threatening deterioration, and critical care services (excluding separately billable procedures) totalled 30-74 minutes          Disposition  Final diagnoses:   Hypoglycemia Hypothermia, initial encounter   Hypotension   Severe sepsis (HCC)   Lower extremity cellulitis   Bilateral pleural effusion   New onset atrial fibrillation (HCC)   Pressure ulcer - Ischium, sacrum, buttocks     Time reflects when diagnosis was documented in both MDM as applicable and the Disposition within this note     Time User Action Codes Description Comment    12/18/2018  7:25 AM Zee KWAN Add [E16 2] Hypoglycemia     12/18/2018  7:25 AM Archana Skill Add Ltanya Mile  XXXA] Hypothermia, initial encounter     12/18/2018  7:25 AM Catrachito Garza Add [I95 9] Hypotension     12/18/2018  7:25 AM Catrachito Garza Add [A41 9,  R65 20] Severe sepsis ([facility] [address] )     12/18/2018  7:25 AM Archana Skill Add [G17 193] Lower extremity cellulitis     12/18/2018  7:26 AM Zee KWAN Add [J90] Bilateral pleural effusion     12/18/2018  7:26 AM Zee KWAN Modify [E16 2] Hypoglycemia     12/18/2018  7:26 AM Archana Skill Modify [I95 9] Hypotension     12/18/2018  7:26 AM Archana Skill Modify [I95 9] Hypotension     12/18/2018  7:26 AM Archana Skill Modify [A41 9,  R65 20] Severe sepsis ([facility] [address] )     12/18/2018  7:26 AM Archana Skill Add [I48 91] New onset atrial fibrillation ([facility] [address] )     12/18/2018  7:27 AM Archana Skill Add [L89 90] Pressure ulcer     12/18/2018  7:28 AM Archana Skill Modify [L89 90] Pressure ulcer Ischium, sacrum, buttocks    12/18/2018  5:07 PM Prudence Sung A Add [I50 9] Acute congestive heart failure, unspecified heart failure type ([facility] [address] )     12/18/2018  5:07 PM Katherleen Gaucher A Add [R57 9] Shock ([facility] [address] )     12/20/2018  3:29 PM [place] Holter Add [E11 9] Diabetes mellitus ([facility] [address] )     12/20/2018 10:28 PM Alpheus Card Modify [A41 9,  R65 20] Severe sepsis ([facility] [address] )     12/20/2018 10:28 PM Amol Hayes Add [J96 01,  J96 02] Acute respiratory failure with hypoxia and hypercapnia ([facility] [address] )     12/21/2018  2:01 AM Caio Saunders Add [I87 2] Venous insufficiency of both lower extremities, suspected     12/21/2018 11:08 AM Nolan PATRICK Add [A41 9,  R65 21] Septic shock ([facility] [address] )     12/22/2018  1:38 PM Zonia Wallace Add [K66 8] Pneumoperitoneum       ED Disposition     ED Disposition Condition Comment    Admit  Case was discussed with Dr Alma Rosa Gong and the patient's admission status was agreed to be Admission Status: inpatient status to the service of Dr Alma Rosa Gong   Follow-up Information    None         Current Discharge Medication List      CONTINUE these medications which have NOT CHANGED    Details   allopurinol (ZYLOPRIM) 300 mg tablet Take 300 mg by mouth daily      lisinopril (ZESTRIL) 10 mg tablet Take 10 mg by mouth daily      metFORMIN (GLUCOPHAGE) 500 mg tablet Take 500 mg by mouth 2 (two) times a day with meals           No discharge procedures on file      ED Provider  Electronically Signed by           Edwin Caldwell DO  12/22/18 2128

## 2018-12-19 PROBLEM — I95.9 HYPOTENSION: Status: ACTIVE | Noted: 2018-01-01

## 2018-12-19 PROBLEM — R77.8 ELEVATED TROPONIN: Status: RESOLVED | Noted: 2018-01-01 | Resolved: 2018-01-01

## 2018-12-19 PROBLEM — I87.2 VENOUS INSUFFICIENCY OF BOTH LOWER EXTREMITIES: Status: ACTIVE | Noted: 2018-01-01

## 2018-12-19 PROBLEM — R06.89 ACUTE RESPIRATORY INSUFFICIENCY: Status: ACTIVE | Noted: 2018-01-01

## 2018-12-19 NOTE — PROGRESS NOTES
Progress Note - Critical Care   Manuel Moore 80 y o  male MRN: [id number]  Unit/Bed#:  Encounter: [id number]    Assessment:   Principal Problem:    Shock ([facility] [address] )  Active Problems:    Hypoglycemia    Heavy tobacco smoker    Hypothermia    Somnolence    Diabetes mellitus ([facility] [address] )    A-fib ([facility] [address] )    Acute respiratory insufficiency    Hypotension    Venous insufficiency of both lower extremities, suspected        Plan: Critical Care Management as outlined below:     Neuro:  Awake, alert and oriented x3  No focal neurologic deficits  Denies pain  Continue neurologic exams  CV:  Shock, likely cardiogenic  Atrial fibrillation with rate currently controlled in the 80s  Remains on Levophed and vasopressin to maintain mean arterial pressure greater than 65  Echocardiogram done yesterday shows ejection fraction 40-45% with septal dyskinesis, right ventricular dilation and RV systolic dysfunction mildly reduced  Continue close monitoring  Follow-up cardiology consult  CVP 5  Lung:  Acute respiratory insufficiency  Still requiring BiPAP  Oxygen saturations in the 90-92% range while on BiPAP  Wheezing throughout, continue respiratory protocol  Continue aggressive pulmonary toilet  Likely COPD with history of heavy tobacco use and oxygen saturations may be 88-92% at baseline  Continue nicotine patch  GI:  Abdominal exam benign  Remains NPO currently  Consider start diet when off BiPAP  FEN:  5 4 L positive since admission  Electrolytes unremarkable, continue to monitor and replete as needed  :  Good urine output  BUN elevated  Creatinine normal   Acute kidney injury improved  Consider discontinue Henson catheter today  ID:  Afebrile  Hypothermia resolved  Leukopenia on admission, now with mild leukocytosis  Follow-up blood and fungal cultures  No obvious source of infection  Follow-up procalcitonin  Continue broad-spectrum antibiotics for now       Heme:  H&H, platelets normal  Continue subcutaneous heparin and SCDs for DVT prophylaxis  Endo:  History of type 2 diabetes  Blood glucose well controlled  Continue sliding scale coverage  Msk/Skin:  Bilateral lower extremity skin breakdown consistent with possible venous insufficiency  Possible fungal cellulitis  Continue to monitor closely  Continue frequent repositioning and offloading  Disposition:  Continue ICU management     ______________________________________________________________________    Chief Complaint:  I want the mask off      HPI/24hr events:  Patient admitted yesterday with hypothermia, leukopenia, hypotension, atrial fibrillation hypoglycemia in shortness of breath  Placed on BiPAP with some improvement  Requiring pressors for blood pressure management     ______________________________________________________________________    Physical Exam:   Physical Exam   Constitutional: He is oriented to person, place, and time  He appears well-developed  He is cooperative  Non-toxic appearance  He appears ill  No distress  Face mask in place  HENT:   Head: Normocephalic and atraumatic  Eyes: Pupils are equal, round, and reactive to light  Conjunctivae are normal    Cardiovascular: Normal rate, intact distal pulses and normal pulses  An irregularly irregular rhythm present  Pulmonary/Chest: No accessory muscle usage  Tachypnea noted  No respiratory distress  He has decreased breath sounds in the right lower field and the left lower field  He has wheezes (Expiratory wheezing throughout )  He has rhonchi (Scattered, clears with cough )  He has no rales  Abdominal: Soft  Normal appearance  He exhibits no distension  There is no tenderness  There is no rigidity and no guarding  Neurological: He is alert and oriented to person, place, and time  He has normal strength  GCS eye subscore is 4  GCS verbal subscore is 5  GCS motor subscore is 6     Skin:   Erythema and leather like appearance of bilateral lower extremities below the knees  ______________________________________________________________________  Vitals:    18 2300 18 0153 18 0320 18 0417   BP: 106/52      BP Location:       Pulse: 99      Resp: 18      Temp: 98 8 °F (37 1 °C)      TempSrc:       SpO2: 93% 93% 93% 92%   Weight:       Height:         Arterial Line BP: 105/63  Arterial Line MAP (mmHg): 72 mmHg     Temperature:   Temp (24hrs), Av 4 °F (35 2 °C), Min:91 °F (32 8 °C), Max:99 °F (37 2 °C)    Current Temperature: 98 8 °F (37 1 °C)  Weights:   IBW: 68 4 kg    Body mass index is 43 95 kg/m²  Weight (last 2 days)     Date/Time   Weight    18 0900  131 (289 02)    18 0629  131 (288 8)            Hemodynamic Monitoring:  N/A     Non-Invasive/Invasive Ventilation Settings:  Respiratory    Lab Data (Last 4 hours)    None         O2/Vent Data (Last 4 hours)       0153  0320        Non-Invasive Ventilation Mode BiPAP BiPAP                No results found for: PHART, KSB0YOD, PO2ART, BID9HIE, U8AJSCRT, BEART, SOURCE  SpO2: SpO2: 92 %, SpO2 Activity: SpO2 Activity: At Rest, SpO2 Device: O2 Device: Other (comment) (BIPAP)  Intake and Outputs:  I/O        0701 -  0700  07 -  0700    I V  (mL/kg)  2809 6 (21 4)    IV Piggyback  3770 8    Total Intake(mL/kg)  6580 4 (50 2)    Urine (mL/kg/hr)  1175 (0 4)    Total Output   1175    Net   +5405 4              UOP:  125 mL/hour   Nutrition:        Diet Orders            Start     Ordered    18 0930  Room Service  Once     Question:  Type of Service  Answer:  Room Service - Appropriate with Assistance    18 0929    18 2230  Diet NPO  Diet effective now     Question Answer Comment   Diet Type NPO    RD to adjust diet per protocol?  No        18 0838          Labs:     Results from last 7 days  Lab Units 18  0327 18  0641 18  0638   WBC Thousand/uL 11 63* 4 68  --    HEMOGLOBIN g/dL 16 4 17 3*  --    I STAT HEMOGLOBIN g/dl  --   --  18 4*   HEMATOCRIT % 46 5 48 1  --    HEMATOCRIT, ISTAT %  --   --  54*   PLATELETS Thousands/uL 222 211  --    NEUTROS PCT %  --  81*  --    MONOS PCT %  --  7  --    MONO PCT % 4  --   --       Results from last 7 days  Lab Units 12/19/18  0327 12/18/18  0641 12/18/18  0638 12/18/18  0637   POTASSIUM mmol/L 4 4 4 0  --   --    CHLORIDE mmol/L 102 100  --   --    CO2 mmol/L 25 30  --   --    CO2, I-STAT mmol/L  --   --  32 35*   BUN mg/dL 77* 84*  --   --    CREATININE mg/dL 1 26 1 57*  --   --    CALCIUM mg/dL 8 4 8 5  --   --    ALK PHOS U/L  --  160*  --   --    ALT U/L  --  48  --   --    AST U/L  --  60*  --   --    GLUCOSE, ISTAT mg/dl  --   --  21* <20*       Results from last 7 days  Lab Units 12/19/18  0327   MAGNESIUM mg/dL 2 3       Results from last 7 days  Lab Units 12/19/18  0327   PHOSPHORUS mg/dL 4 5*        Results from last 7 days  Lab Units 12/18/18  0641   INR  1 16   PTT seconds 31       Results from last 7 days  Lab Units 12/18/18  0641   LACTIC ACID mmol/L 1 7       0  Lab Value Date/Time   TROPONINI 0 04 12/18/2018 2206   TROPONINI 0 04 12/18/2018 1914   TROPONINI 0 05 (H) 12/18/2018 1639   TROPONINI 0 04 12/18/2018 0641     Imaging:  Portable chest x-ray shows mild pulmonary edema relatively unchanged from yesterday  Await official read  I have personally reviewed pertinent reports     and I have personally reviewed pertinent films in PACS    Micro:  No results found for: Adelene Pucker, WOUNDCULT, SPUTUMCULTUR  Allergies: No Known Allergies  Medications:   Scheduled Meds:  Current Facility-Administered Medications:  albuterol 2 5 mg Nebulization Q6H PRN Kendra Glaze, DO    cefTRIAXone 1,000 mg Intravenous Q12H KIKA Phillips Last Rate: Stopped (12/18/18 1845)   chlorhexidine 15 mL Swish & Spit Q12H [facility] KIKA Phillips    dextrose 100 mL/hr Intravenous Continuous Sia De Leon DO Last Rate: 100 mL/hr (12/19/18 0307) heparin (porcine) 7,500 Units Subcutaneous [facility] Yadiel Alken, CRNP    insulin lispro 2-12 Units Subcutaneous Q6H [address] Yadiel Alken, CRNP    nicotine 21 mg Transdermal Daily Yadiel Alken, CRNP    norepinephrine 1-30 mcg/min Intravenous Titrated Yadiel Alken, CRNP Last Rate: 15 mcg/min (12/19/18 0406)   vancomycin 12 5 mg/kg (Adjusted) Intravenous Q12H Yadiel Alken, CRNP Last Rate: Stopped (12/18/18 2205)   vasopressin (PITRESSIN) in 0 9 % sodium chloride 100 mL 0 04 Units/min Intravenous Continuous Yadiel Alken, CRNP Last Rate: 0 04 Units/min (12/19/18 0043)     Continuous Infusions:  dextrose 100 mL/hr Last Rate: 100 mL/hr (12/19/18 0304)   norepinephrine 1-30 mcg/min Last Rate: 15 mcg/min (12/19/18 0406)   vasopressin (PITRESSIN) in 0 9 % sodium chloride 100 mL 0 04 Units/min Last Rate: 0 04 Units/min (12/19/18 0043)     PRN Meds:    albuterol 2 5 mg Q6H PRN     VTE Pharmacologic Prophylaxis: Heparin  VTE Mechanical Prophylaxis: sequential compression device  Invasive lines and devices: Invasive Devices     Central Venous Catheter Line            CVC Central Lines 12/18/18 Triple 16cm less than 1 day          Peripheral Intravenous Line            Peripheral IV 12/18/18 Left Antecubital 1 day    Peripheral IV 12/18/18 Right Wrist 1 day    Peripheral IV 12/18/18 Left Forearm less than 1 day          Arterial Line            Arterial Line 12/18/18 Left Radial less than 1 day          Drain            Urethral Catheter Temperature probe 16 Fr  less than 1 day                   Counseling / Coordination of Care  Total Critical Care time spent 41 minutes excluding procedures, teaching and family updates  Code Status: Level 1 - Full Code    Portions of the record may have been created with voice recognition software  Occasional wrong word or "sound a like" substitutions may have occurred due to the inherent limitations of voice recognition software    Read the chart carefully and recognize, using context, where substitutions have occurred      Crystal Ruiz PA-C

## 2018-12-19 NOTE — CONSULTS
Consultation - Cardiology   Spencer Katz 80 y o  male MRN: [id number]  Unit/Bed#:  Encounter: [id number]    Assessment/Plan     Principal Problem:    Shock [facility])  Active Problems:    Diabetes mellitus ([facility] [address] )    Heavy tobacco smoker    Hypoglycemia    Hypothermia    Somnolence    A[facility]fib ([facility] )    Acute respiratory insufficiency    Hypotension    Venous insufficiency of both lower extremities, suspected      Assessment/Plan    1  Acute hypoxic hypercarbic respiratory failure  Improved on bipap  Multifactorial - sepsis/volume overload  2  Acute on chronic systolic and diastolic biventricular heart failure  ProBNP 8000   Chest x-ray small moderate right pleural effusion and small left pleural effusion pulmonary vasculature prominent  Patient with recent increased shortness of breath/edema with escalating diuretic dose at home  Patient is grossly volume overloaded and will need diuresis once blood pressure allows  He was given 60mg IV last PM  Total output last 24 hours 1750     3  Shock likely septic -requiring pressors, levo and vasopressin  Status post fluid resuscitation  Overall positive 5 5 liters  Procalcitonin elevated  Bandemia  Antibiotics and pressors  per CCM  BC pending  Pt with open leg wounds  4  Acute kidney injury- secondary to significant volume overload/sepsis  Monitor, improved  5  Atrial fibrillation -rate controlled  New diagosis  [id number] = 5  Probable eventual AC  Pt does report easy bruisability  6  Severe hypoglycemia-treated and resolved  Pt with DM II/ takes metformin    7  Hypothermia-resolved    8  Suspected COPD/heavy tobacco use    9  Hypoalbuminemia- likely contributing to edema which has been chronic for some time       History of Present Illness   Physician Requesting Consult: Maylene Hodgkin, DO  Reason for Consult / Principal Problem: heart failure    HPI: Spencer Katz is a 80y o  year old male with diabetes, HTN, chronic lower extremity edema, suspected COPD/heavy tobacco abuse who presents with increased shortness of breath, altered mental status and severe hypoglycemia  He has had worsened shortness of breath over the past couple of months and increased mucus production  Legs have become much more swollen up to the buttocks  Recently his torsemide dose was increased by his primary care physician who asked to see the patient in the office but the patient declined  He has been sleeping in a recliner for a year because he has been having a hard time getting out of his bed  He has been noticing weakness and gait dysfunction  His wife is at bedside and said he takes whatever pills he wants to take  Yesterday morning the wife found the patient unresponsive and called 911  He was found have a blood sugar less than 20 and received 25 g of D 10  He was also hypoxic in the 70s with improvement with albuterol on a face mask  In the ED he was hypothermic with a temperature of 91°  Blood pressure initially was hypertensive but then became hypotensive with blood pressure of 77/43  He was given 3 L of saline and started on Levophed  He was also given a gram of Rocephin and 2 g of vancomycin empirically  Due to increased work of breathing he was placed on BiPA P  He was placed on a [employer]  He was transferred to ICU  Transthoracic echocardiogram revealed an EF of 40-45% with septal dyskinesis otherwise mildly hypokinetic  Grade 1 diastolic dysfunction  RV dilated with mildly reduced systolic function  There is mild aortic stenosis and moderate TR  Cardiology was consulted for suspected cardiogenic shock  This morning he is also on vasopressin  in addition to Levophed  According to the nurse at bedside his pressor requirements have increased overnight  He is still on BiPAP  Wife is at the bedside  He has noticed recent decreased urination  His appetite is a bit decreased  Legs have been weeping       Patient is not fond of seeking medical attention  He occasionally sees his primary care physician  He denies any previous cardiac workup including echocardiogram, stress test or cardiac catheterization  Blood cultures are pending  Procalcitonin 0 50  Urine negative for nitrates and leukocytes  Positive blood  ProBNP 8, 391  Creatinine 1 57 down to 1 2 troponin 0 05  WBC 43123  Current MAP 55  CVP 12/13  Vasopressin at 0 04 and levophed at 14mcg    Inpatient consult to Cardiology  Consult performed by: Angelo Navarrete ordered by: Apoorva Thao          Review of Systems   Constitutional: Positive for activity change, appetite change, fatigue and unexpected weight change  Negative for fever  Respiratory: Positive for cough, shortness of breath and wheezing  Cardiovascular: Positive for leg swelling  Negative for chest pain and palpitations  Gastrointestinal: Positive for abdominal distention  Genitourinary: Positive for decreased urine volume  Musculoskeletal: Positive for gait problem  Skin: Positive for wound  Neurological: Positive for weakness  Negative for dizziness and light-headedness  Hematological: Bruises/bleeds easily  Historical Information   Past Medical History:   Diagnosis Date    Diabetes mellitus ([facility] [address] )     Type 2    Gout     Hypertension      History reviewed  No pertinent surgical history  History   Alcohol Use No     History   Drug Use No     History   Smoking Status    Current Every Day Smoker    Packs/day: 1 00   Smokeless Tobacco    Current User    Types: Chew     Family History: History reviewed  No pertinent family history      Meds/Allergies   current meds:   Current Facility-Administered Medications   Medication Dose Route Frequency    albuterol inhalation solution 2 5 mg  2 5 mg Nebulization Q6H PRN    cefTRIAXone (ROCEPHIN) 1,000 mg in dextrose 5 % 50 mL IVPB  1,000 mg Intravenous Q12H    chlorhexidine (PERIDEX) 0 12 % oral rinse 15 mL  15 mL Swish & Spit Q12H [facility]    dextrose infusion 10 %  100 mL/hr Intravenous Continuous    heparin (porcine) subcutaneous injection 7,500 Units  7,500 Units Subcutaneous Q8H [address]    insulin lispro (HumaLOG) 100 units/mL subcutaneous injection 2-12 Units  2-12 Units Subcutaneous Q6H [address]    nicotine (NICODERM CQ) 21 mg/24 hr TD 24 hr patch 21 mg  21 mg Transdermal Daily    norepinephrine (LEVOPHED) 8 mg (DOUBLE CONCENTRATION) IV in sodium chloride 0 9% 250 mL  1-30 mcg/min Intravenous Titrated    vancomycin (VANCOCIN) 1,250 mg in sodium chloride 0 9 % 250 mL IVPB  12 5 mg/kg (Adjusted) Intravenous Q12H    vasopressin (PITRESSIN) 20 Units in sodium chloride 0 9 % 100 mL infusion  0 04 Units/min Intravenous Continuous    and PTA meds:  Prescriptions Prior to Admission   Medication    allopurinol (ZYLOPRIM) 300 mg tablet    lisinopril (ZESTRIL) 10 mg tablet    metFORMIN (GLUCOPHAGE) 500 mg tablet     No Known Allergies    Objective   Vitals: Blood pressure 102/55, pulse 85, temperature 98 4 °F (36 9 °C), resp  rate (!) 27, height 5' 8" (1 727 m), weight 131 kg (289 lb 0 4 oz), SpO2 93 %    Orthostatic Blood Pressures      Most Recent Value   Blood Pressure  102/55 filed at 12/19/2018 0300   Patient Position - Orthostatic VS  Lying filed at 12/18/2018 1902            Intake/Output Summary (Last 24 hours) at 12/19/18 0919  Last data filed at 12/19/18 0800   Gross per 24 hour   Intake           3994 1 ml   Output             1600 ml   Net           2394 1 ml       Invasive Devices     Central Venous Catheter Line            CVC Central Lines 12/18/18 Triple 16cm less than 1 day          Peripheral Intravenous Line            Peripheral IV 12/18/18 Left Forearm 1 day          Arterial Line            Arterial Line 12/18/18 Left Radial less than 1 day          Drain            Urethral Catheter Temperature probe 16 Fr  1 day                Physical Exam: /55   Pulse 85   Temp 98 4 °F (36 9 °C)   Resp (!) 27   Ht 5' 8" (1 727 m)   Wt 131 kg (289 lb 0 4 oz)   SpO2 93%   BMI 43 95 kg/m²   General Appearance:    Alert, cooperative, no distress, appears older than stated age   Head:    Normocephalic, no scleral icterus   Eyes:    PERRL   Nose:   Nares normal, septum midline, mucosa normal, no drainage    Throat:   Lips, mucosa, and tongue normal   Neck:   Supple, symmetrical, trachea midline       Back:     Symmetric   Lungs:     Decreased to auscultation bilaterally, respirations slight labored on bipap   Chest Wall:    No tenderness or deformity    Heart:    Iregular rate and rhythm, S1 and S2 normal, no murmur, rub   or gallop   Abdomen:     Large firm generalized tender   Extremities:   Legs wrapped, arms warm  Gross edema from feet to abdomen/buttocks           Neurologic:   Alert and oriented to person place and time   No focal deficits       Lab Results:   Recent Results (from the past 72 hour(s))   Fingerstick Glucose (POCT)    Collection Time: 12/18/18  6:30 AM   Result Value Ref Range    POC Glucose <20 (LL) 65 - 140 mg/dl   POCT Blood Gas (CG8+)    Collection Time: 12/18/18  6:37 AM   Result Value Ref Range    ph, Marcos ISTAT 7 307 7 300 - 7 400    pCO2, Marcos i-STAT 66 3 (H) 42 0 - 50 0 mm HG    pO2, Marcos i-STAT 39 0 35 0 - 45 0 mm HG    BE, i-STAT 4 (H) -2 - 3 mmol/L    HCO3, Marcos i-STAT 33 2 (H) 24 0 - 30 0 mmol/L    CO2, i-STAT 35 (H) 21 - 32 mmol/L    O2 Sat, i-STAT 66 (L) 95 - 98 %    SODIUM, I-STAT 139 136 - 145 mmol/l    Potassium, i-STAT 4 0 3 5 - 5 3 mmol/L    Calcium, Ionized i-STAT 1 07 (L) 1 12 - 1 32 mmol/L    Hct, i-STAT 54 (H) 36 5 - 49 3 %    Hgb, i-STAT 18 4 (H) 12 0 - 17 0 g/dl    Glucose, i-STAT <20 (LL) 65 - 140 mg/dl    Specimen Type VENOUS    POCT Chem 8+    Collection Time: 12/18/18  6:38 AM   Result Value Ref Range    SODIUM, I-STAT 138 136 - 145 mmol/l    Potassium, i-STAT 3 9 3 5 - 5 3 mmol/L    Chloride, istat 97 (L) 100 - 108 mmol/L    CO2, i-STAT 32 21 - 32 mmol/L    Anion Gap, i-STAT 13 4 - 13 mmol/L    Calcium, Ionized i-STAT 1 06 (L) 1 12 - 1 32 mmol/L    BUN, I-STAT 67 (H) 5 - 25 mg/dl    Creatinine, i-STAT 1 2 0 6 - 1 3 mg/dl    eGFR 62 ml/min/1 73sq m    Glucose, i-STAT 21 (LL) 65 - 140 mg/dl    Hct, i-STAT 54 (H) 36 5 - 49 3 %    Hgb, i-STAT 18 4 (H) 12 0 - 17 0 g/dl    Specimen Type VENOUS    Fingerstick Glucose (POCT)    Collection Time: 12/18/18  6:40 AM   Result Value Ref Range    POC Glucose 102 65 - 140 mg/dl   CBC and differential    Collection Time: 12/18/18  6:41 AM   Result Value Ref Range    WBC 4 68 4 31 - 10 16 Thousand/uL    RBC 5 28 3 88 - 5 62 Million/uL    Hemoglobin 17 3 (H) 12 0 - 17 0 g/dL    Hematocrit 48 1 36 5 - 49 3 %    MCV 91 82 - 98 fL    MCH 32 8 26 8 - 34 3 pg    MCHC 36 0 31 4 - 37 4 g/dL    RDW 18 5 (H) 11 6 - 15 1 %    MPV 10 7 8 9 - 12 7 fL    Platelets 315 267 - 101 Thousands/uL    nRBC 0 /100 WBCs    Neutrophils Relative 81 (H) 43 - 75 %    Immat GRANS % 0 0 - 2 %    Lymphocytes Relative 11 (L) 14 - 44 %    Monocytes Relative 7 4 - 12 %    Eosinophils Relative 1 0 - 6 %    Basophils Relative 0 0 - 1 %    Neutrophils Absolute 3 76 1 85 - 7 62 Thousands/µL    Immature Grans Absolute 0 01 0 00 - 0 20 Thousand/uL    Lymphocytes Absolute 0 52 (L) 0 60 - 4 47 Thousands/µL    Monocytes Absolute 0 31 0 17 - 1 22 Thousand/µL    Eosinophils Absolute 0 06 0 00 - 0 61 Thousand/µL    Basophils Absolute 0 02 0 00 - 0 10 Thousands/µL   Protime-INR    Collection Time: 12/18/18  6:41 AM   Result Value Ref Range    Protime 14 5 (H) 11 8 - 14 2 seconds    INR 1 16 0 86 - 1 17   APTT    Collection Time: 12/18/18  6:41 AM   Result Value Ref Range    PTT 31 26 - 38 seconds   Basic metabolic panel    Collection Time: 12/18/18  6:41 AM   Result Value Ref Range    Sodium 140 136 - 145 mmol/L    Potassium 4 0 3 5 - 5 3 mmol/L    Chloride 100 100 - 108 mmol/L    CO2 30 21 - 32 mmol/L    ANION GAP 10 4 - 13 mmol/L    BUN 84 (H) 5 - 25 mg/dL    Creatinine 1 57 (H) 0 60 - 1 30 mg/dL    Glucose 19 (LL) 65 - 140 mg/dL    Calcium 8 5 8 3 - 10 1 mg/dL    eGFR 45 ml/min/1 73sq m   Hepatic function panel    Collection Time: 12/18/18  6:41 AM   Result Value Ref Range    Total Bilirubin 0 80 0 20 - 1 00 mg/dL    Bilirubin, Direct 0 55 (H) 0 00 - 0 20 mg/dL    Alkaline Phosphatase 160 (H) 46 - 116 U/L    AST 60 (H) 5 - 45 U/L    ALT 48 12 - 78 U/L    Total Protein 6 3 (L) 6 4 - 8 2 g/dL    Albumin 2 7 (L) 3 5 - 5 0 g/dL   Lipase    Collection Time: 12/18/18  6:41 AM   Result Value Ref Range    Lipase 494 (H) 73 - 393 u/L   Lactic acid, plasma    Collection Time: 12/18/18  6:41 AM   Result Value Ref Range    LACTIC ACID 1 7 0 5 - 2 0 mmol/L   B-type natriuretic peptide    Collection Time: 12/18/18  6:41 AM   Result Value Ref Range    NT-proBNP 8,391 (H) <125 pg/mL   Troponin I    Collection Time: 12/18/18  6:41 AM   Result Value Ref Range    Troponin I 0 04 <=0 04 ng/mL   TSH, 3rd generation    Collection Time: 12/18/18  6:41 AM   Result Value Ref Range    TSH 3RD GENERATON 3 907 (H) 0 358 - 3 740 uIU/mL   ECG 12 lead    Collection Time: 12/18/18  6:47 AM   Result Value Ref Range    Ventricular Rate 72 BPM    Atrial Rate 326 BPM    LA Interval  ms    QRSD Interval 112 ms    QT Interval 418 ms    QTC Interval 457 ms    P Axis  degrees    QRS Axis 221 degrees    T Wave Axis 170 degrees   ECG 12 lead    Collection Time: 12/18/18  7:22 AM   Result Value Ref Range    Ventricular Rate 69 BPM    Atrial Rate 150 BPM    LA Interval  ms    QRSD Interval 112 ms    QT Interval 380 ms    QTC Interval 407 ms    P Axis  degrees    QRS Axis 246 degrees    T Wave Axis 260 degrees   Fingerstick Glucose (POCT)    Collection Time: 12/18/18  7:42 AM   Result Value Ref Range    POC Glucose 48 (L) 65 - 140 mg/dl   UA w Reflex to Microscopic w Reflex to Culture    Collection Time: 12/18/18  8:20 AM   Result Value Ref Range    Color, UA Yellow     Clarity, UA Slightly Cloudy     Specific Gravity, UA >=1 030 1 003 - 1 030    pH, UA 5 0 4 5 - 8 0 Leukocytes, UA Negative Negative    Nitrite, UA Negative Negative    Protein, UA 30 (1+) (A) Negative mg/dl    Glucose, UA Negative Negative mg/dl    Ketones, UA Trace (A) Negative mg/dl    Urobilinogen, UA 0 2 0 2, 1 0 E U /dl E U /dl    Bilirubin, UA Moderate (A) Negative    Blood, UA Moderate (A) Negative   Urine Microscopic    Collection Time: 12/18/18  8:20 AM   Result Value Ref Range    RBC, UA 1-2 (A) None Seen, 0-5 /hpf    WBC, UA 2-4 (A) None Seen, 0-5, 5-55, 5-65 /hpf    Epithelial Cells Occasional None Seen, Occasional /hpf    Bacteria, UA Occasional None Seen, Occasional /hpf    Hyaline Casts, UA 1-2 (A) (none) /lpf    AMORPH URATES Moderate /hpf   Fingerstick Glucose (POCT)    Collection Time: 12/18/18  8:43 AM   Result Value Ref Range    POC Glucose 95 65 - 140 mg/dl   Fingerstick Glucose (POCT)    Collection Time: 12/18/18  9:05 AM   Result Value Ref Range    POC Glucose 77 65 - 140 mg/dl   Fingerstick Glucose (POCT)    Collection Time: 12/18/18 11:26 AM   Result Value Ref Range    POC Glucose 68 65 - 140 mg/dl   Fingerstick Glucose (POCT)    Collection Time: 12/18/18  1:12 PM   Result Value Ref Range    POC Glucose 80 65 - 140 mg/dl   Troponin I    Collection Time: 12/18/18  4:39 PM   Result Value Ref Range    Troponin I 0 05 (H) <=0 04 ng/mL   Fingerstick Glucose (POCT)    Collection Time: 12/18/18  5:35 PM   Result Value Ref Range    POC Glucose 87 65 - 140 mg/dl   Troponin I    Collection Time: 12/18/18  7:14 PM   Result Value Ref Range    Troponin I 0 04 <=0 04 ng/mL   Troponin I    Collection Time: 12/18/18 10:06 PM   Result Value Ref Range    Troponin I 0 04 <=0 04 ng/mL   Fingerstick Glucose (POCT)    Collection Time: 12/19/18 12:16 AM   Result Value Ref Range    POC Glucose 133 65 - 140 mg/dl   Procalcitonin    Collection Time: 12/19/18  3:27 AM   Result Value Ref Range    Procalcitonin 0 50 (H) <=0 25 ng/ml   Basic metabolic panel    Collection Time: 12/19/18  3:27 AM   Result Value Ref Range    Sodium 138 136 - 145 mmol/L    Potassium 4 4 3 5 - 5 3 mmol/L    Chloride 102 100 - 108 mmol/L    CO2 25 21 - 32 mmol/L    ANION GAP 11 4 - 13 mmol/L    BUN 77 (H) 5 - 25 mg/dL    Creatinine 1 26 0 60 - 1 30 mg/dL    Glucose 170 (H) 65 - 140 mg/dL    Calcium 8 4 8 3 - 10 1 mg/dL    eGFR 59 ml/min/1 73sq m   CBC and differential    Collection Time: 12/19/18  3:27 AM   Result Value Ref Range    WBC 11 63 (H) 4 31 - 10 16 Thousand/uL    RBC 5 15 3 88 - 5 62 Million/uL    Hemoglobin 16 4 12 0 - 17 0 g/dL    Hematocrit 46 5 36 5 - 49 3 %    MCV 90 82 - 98 fL    MCH 31 8 26 8 - 34 3 pg    MCHC 35 3 31 4 - 37 4 g/dL    RDW 18 4 (H) 11 6 - 15 1 %    MPV 10 6 8 9 - 12 7 fL    Platelets 845 058 - 511 Thousands/uL    nRBC 0 /100 WBCs   Cortisol    Collection Time: 12/19/18  3:27 AM   Result Value Ref Range    Cortisol, Random 51 0 ug/dL   Magnesium    Collection Time: 12/19/18  3:27 AM   Result Value Ref Range    Magnesium 2 3 1 6 - 2 6 mg/dL   Phosphorus    Collection Time: 12/19/18  3:27 AM   Result Value Ref Range    Phosphorus 4 5 (H) 2 3 - 4 1 mg/dL   Manual Differential(PHLEBS Do Not Order)    Collection Time: 12/19/18  3:27 AM   Result Value Ref Range    Segmented % 82 (H) 43 - 75 %    Bands % 11 (H) 0 - 8 %    Lymphocytes % 3 (L) 14 - 44 %    Monocytes % 4 4 - 12 %    Eosinophils, % 0 0 - 6 %    Basophils % 0 0 - 1 %    Absolute Neutrophils 10 82 (H) 1 85 - 7 62 Thousand/uL    Lymphocytes Absolute 0 35 (L) 0 60 - 4 47 Thousand/uL    Monocytes Absolute 0 47 0 00 - 1 22 Thousand/uL    Eosinophils Absolute 0 00 0 00 - 0 40 Thousand/uL    Basophils Absolute 0 00 0 00 - 0 10 Thousand/uL    Total Counted 100     nRBC 1 0 - 2 /100 WBC    Platelet Estimate Adequate Adequate   Fingerstick Glucose (POCT)    Collection Time: 12/19/18  6:11 AM   Result Value Ref Range    POC Glucose 171 (H) 65 - 140 mg/dl   Blood gas, venous    Collection Time: 12/19/18  8:53 AM   Result Value Ref Range    pH, Marcos 7 377 7 300 - 7 400 pCO2, Marcos 47 8 42 0 - 50 0 mm Hg    pO2, Marcos 45 8 (H) 35 0 - 45 0 mm Hg    HCO3, Marcos 27 5 24 - 30 mmol/L    Base Excess, Marcos 1 5 mmol/L    O2 Content, Marcos 18 3 ml/dL    O2 HGB, VENOUS 80 3 (H) 60 0 - 80 0 %    TOD TEST No     Temperature 98 4 Degrees Fehrenheit    Non Vent type BIPAP BIPAP     IPAP 14     EPAP 8     BIPAP fio2 60 %     Imaging: I have personally reviewed pertinent reports  EKG: afib pvc rbbb  VTE Prophylaxis: Heparin    Code Status: Level 1 - Full Code  Advance Directive and Living Will:      Power of :    POLST:      Counseling / Coordination of Care  Total floor / unit time spent today 70 minutes  Greater than 50% of total time was spent with the patient and / or family counseling and / or coordination of care

## 2018-12-19 NOTE — UTILIZATION REVIEW
[address]  Utilization Review Department  Phone: [phone]; Fax [phone]  [email or web address]  ATTENTION: Please call with any questions or concerns to [phone]  and carefully listen to the prompts so that you are directed to the right person  Send all requests for admission clinical reviews, approved or denied determinations and any other requests to fax [phone]  All voicemails are confidential   Initial Clinical Review    Admission: Date/Time/Statement: inpatient 12/18/18 @ 0715     Orders Placed This Encounter   Procedures    Inpatient Admission (expected length of stay for this patient is greater than two midnights)     Standing Status:   Standing     Number of Occurrences:   1     Order Specific Question:   Admitting Physician     Answer:   Carola Bumpers     Order Specific Question:   Level of Care     Answer:   Critical Care [15]     Order Specific Question:   Estimated length of stay     Answer:   More than 2 Midnights     Order Specific Question:   Certification     Answer:   I certify that inpatient services are medically necessary for this patient for a duration of greater than two midnights  See H&P and MD Progress Notes for additional information about the patient's course of treatment  ED: Date/Time/Mode of Arrival:   ED Arrival Information     Expected Arrival Acuity Means of Arrival Escorted By Service Admission Type    - 12/18/2018 06:26 Emergent Ambulance [facility] Critical Care/ICU Emergency    Arrival Complaint    Hypoglycemia          Chief Complaint:   Chief Complaint   Patient presents with    Hypoglycemia - Symptomatic     Pt arrives via EMS with reports of Hypoglycemia, BS was 55 per EMS, given 25g of D10  EMS reports Pt was apneic with crakles at the bases  Pt BS in triage was <20, Provider aware  History of Illness: 80 y o   Male presents via EMS with worsening shortness of breath; wife shares his baseline cough has become productive, with swelling bilaterally in legs increasing, wife sough thelp from PCP who increased Torsemide  This am wife found patient unresponsive-called 911  ED Vital Signs:   ED Triage Vitals   Temperature Pulse Respirations Blood Pressure SpO2   12/18/18 0637 12/18/18 0629 12/18/18 0629 12/18/18 0631 12/18/18 0629   (!) 91 °F (32 8 °C) 85 20 (!) 194/143 90 %      Temp Source Heart Rate Source Patient Position - Orthostatic VS BP Location FiO2 (%)   12/18/18 0637 12/18/18 0629 12/18/18 0629 12/18/18 0629 12/18/18 1127   Rectal Monitor Lying Right arm 60      Pain Score       12/18/18 0837       6        Wt Readings from Last 1 Encounters:   12/18/18 131 kg (289 lb 0 4 oz)       Vital Signs (abnormal): 12/18/2018 Temp: 91F-96 6, Resp 23-39 BP: 85/53, 86/53, 73/50  12/19 Resp 23-38; Abnormal Labs/Diagnostic Test Results:   Lab Units 12/18/18  0641 12/18/18  0638 12/18/18  0637   HEMOGLOBIN g/dL 17 3*  --   --    I STAT HEMOGLOBIN g/dl  --  18 4* 18 4*   HEMATOCRIT, ISTAT %  --  54* 54*   NEUTROS PCT % 81*  --   --      Lab Units 12/18/18  0641 12/18/18  0637   CO2, I-STAT mmol/L  --  35*   BUN mg/dL 84*  --    CREATININE mg/dL 1 57*  --    AST U/L 60*  --    ALK PHOS U/L 160*  --    ALBUMIN g/dL 2 7*  --      EKG: This was personally reviewed by myself  Atrial fibrillation with rate 70  Imaging:  I have personally reviewed pertinent reports    CXR show right IJ with tip in region of SVC, no pneumothorax, patchy bilateral airspace opacities suspicious for pulmonary edema vs multifocal pna, bilateral pleural effusions and cardiomegaly - read by radiologist    ED Treatment:   Medication Administration from 12/18/2018 0626 to 12/18/2018 0904       Date/Time Order Dose Route Action     12/18/2018 0631 dextrose 50 % IV solution 50 mL 50 mL Intravenous Given     12/18/2018 0810 dextrose 5 % and sodium chloride 0 45 % infusion 0 mL/hr Intravenous Stopped 12/18/2018 0742 sodium chloride 0 9 % bolus 1,000 mL 0 mL Intravenous Stopped     12/18/2018 0712 sodium chloride 0 9 % bolus 1,000 mL 1,000 mL Intravenous New Bag     12/18/2018 0741 sodium chloride 0 9 % bolus 1,000 mL 0 mL Intravenous Stopped     12/18/2018 0657 sodium chloride 0 9 % bolus 1,000 mL 1,000 mL Intravenous New Bag     12/18/2018 0654 sodium chloride 0 9 % bolus 1,000 mL 1,000 mL Intravenous New Bag     12/18/2018 0741 ceftriaxone (ROCEPHIN) 1 g/50 mL in dextrose IVPB 0 mg Intravenous Stopped     12/18/2018 0707 ceftriaxone (ROCEPHIN) 1 g/50 mL in dextrose IVPB 1,000 mg Intravenous New Bag     12/18/2018 0743 vancomycin (VANCOCIN) 2,000 mg in sodium chloride 0 9 % 500 mL IVPB 2,000 mg Intravenous New Bag     12/18/2018 0810 sodium chloride 0 9 % bolus 1,000 mL 0 mL Intravenous Stopped     12/18/2018 0735 sodium chloride 0 9 % bolus 1,000 mL 1,000 mL Intravenous New Bag     12/18/2018 0819 norepinephrine (LEVOPHED) 4 mg (STANDARD CONCENTRATION) IV in sodium chloride 0 9% 250 mL 10 mcg/min Intravenous Rate/Dose Change     12/18/2018 0753 norepinephrine (LEVOPHED) 4 mg (STANDARD CONCENTRATION) IV in sodium chloride 0 9% 250 mL 7 mcg/min Intravenous New Bag     12/18/2018 0747 dextrose 50 % IV solution 50 mL 50 mL Intravenous Given     12/18/2018 0810 dextrose infusion 10 % 100 mL/hr Intravenous New Bag     12/18/2018 0750 dextrose 50 % IV solution 50 mL 50 mL Intravenous Given     12/18/2018 0819 dextrose 50 % IV solution 25 mL   Intravenous Canceled Entry          Past Medical/Surgical History:    Active Ambulatory Problems     Diagnosis Date Noted    No Active Ambulatory Problems       Past Medical History:   Diagnosis Date    Diabetes mellitus [facility] [address] )     Gout     Hypertension        Admitting Diagnosis: Hypoglycemia [E16 2]  Hypotension [I95 9]  New onset atrial fibrillation (HCC) [I48 91]  Bilateral pleural effusion [J90]  Lower extremity cellulitis [L03 119]  Pressure ulcer [L89 90]  Hypothermia, initial encounter Jena Crawley  XXXA]  Severe sepsis ([facility] [address] ) [A41 9, R65 20]    Age/Sex: 80 y o  male    Assessment/Plan: 80 y o  Male with worsening shortness of breath with altered mental status  Continue neuro checks, Continue tele monitoring afib -on Levophed gtt-consider Lasix  Continue Resp support with BiPAP  NPO, IV, Hypothermic on admission-trend WBC-consider CAP-possible antibiotics  Trend HGB/HCT Hold Metformin cont accuchecks & IV fluids       Admission Orders:  Cam (ICU) monitoring  Cardio-pulmonary monitoring  Neuro checks Q 4 hr  Nursing dysphagia assessment  Vitals per unit  Measure central venous pressure  Daily weights  BiPAP  PT treat    Scheduled Meds:   Current Facility-Administered Medications:  albuterol 2 5 mg Nebulization Q6H PRN    azithromycin 500 mg Intravenous Q24H    cefTRIAXone 1,000 mg Intravenous Q12H Last Rate: 1,000 mg (12/19/18 0610)   dextrose 50 mL/hr Intravenous Continuous Last Rate: 50 mL/hr (12/19/18 1202)   heparin (porcine) 7,500 Units Subcutaneous Q8H [address]    insulin lispro 2-12 Units Subcutaneous Q6H [address]    nicotine 21 mg Transdermal Daily    norepinephrine 1-30 mcg/min Intravenous Titrated Last Rate: 13 mcg/min (12/19/18 1026)   nystatin  Topical BID    vasopressin (PITRESSIN) in 0 9 % sodium chloride 100 mL 0 04 Units/min Intravenous Continuous Last Rate: 0 04 Units/min (12/19/18 0958)     Continuous Infusions:   dextrose 50 mL/hr Last Rate: 50 mL/hr (12/19/18 1202)   norepinephrine 1-30 mcg/min Last Rate: 13 mcg/min (12/19/18 1026)   vasopressin (PITRESSIN) in 0 9 % sodium chloride 100 mL 0 04 Units/min Last Rate: 0 04 Units/min (12/19/18 0958)

## 2018-12-20 NOTE — DISCHARGE SUMMARY
Kristen He 80 y o  male MRN: [id number]  [facility]   Unit/Bed#:  Encounter: [id number]    Death Pronouncement Note    Patient identified by hospital ID [email or web address], was pronounced at [id number] per brain death protocol  Extubated thereafter  Remained on telemonitor, noted to be in asystole at 1532  No heart sounds auscultated over precordium noted  Family informed at the bedside  Time of death: [id number]  Family was notified: yes  Family member notified: , sons, parents, and sister  Attending physician, Dr Ian Brown, notified       KIKA Mcdonald  December 20, 2018

## 2018-12-20 NOTE — DISCHARGE INSTR - OTHER ORDERS
Recommend:   See a podiatrist on an outpatient basis for routine foot/lower leg care  Amlactin ordered as scheduled QD to intact skin on bilat lower legs, feet but not between toes  Apply when doing daily dressing changes to lower legs  Xeroform gauze to bilat lower legs, covered with ABD pads and kerlix roll  Change q day and prn  Maxorb to highly draining wounds on right inner thigh and left knee area  Continue Calazime and bordered foam dressing to sacrum  Change q 3 days and prn  Dermagran to arm skin tears  Cover with dry gauze and kerlix roll  Change q 2 days and prn  Moisturize skin daily after bathing with blue top moisturizing lotion  Frequent repositioning as pt  Tolerates  Elevate heels  Soft Chair cushion when pt   Out of bed

## 2018-12-20 NOTE — PROGRESS NOTES
Progress Note - Critical Care   Reg Puga 80 y o  male MRN: [id number]  Unit/Bed#:  Encounter: [id number]    Attending Physician: Kayleigh Morel, DO      ______________________________________________________________________  Assessment and Plan:   Principal Problem:    Shock ([facility] [address] )  Active Problems:    Hypoglycemia    Heavy tobacco smoker    Hypothermia    Somnolence    Diabetes mellitus ([facility] [address] )    [facility]    Acute respiratory insufficiency    Hypotension    Venous insufficiency of both lower extremities, suspected  Resolved Problems:    Elevated troponin      Neuro: Cont neuro checks and CAM-ICU  Sleep promotion and delirium prevention      CV: Cont telemonitoring, Hold lisinopril while on pressor  Continue on Levophed and vasopressin drip and titrate for MAP > 65  Consider Lasix      Pulm: Monitor saturations and keep > 92%  Continue BiPAP and wean to NC as tolerated      GI: Keep NPO while on BiPAP       : Dominguez intact, provide dominguez catheter care  Monitor I's and O's and kidney function  Trend electrolytes and replete as necessary       ID: Normothermic, continue to monitor temperature and WBC  Currently on day 2 azithromycin for PNA  Heme: Stable  Trend and transfuse if < 7 0       Endo: Continue to monitor      Msk/Skin: Hold allopurinol       Disposition: ICU for close monitoring and management of blood pressure and respiratory status while on BiPAP    Code Status: Level 1 - Full Code    Counseling / Coordination of Care  Total Critical Care time spent 60 minutes excluding procedures, teaching and family updates  ______________________________________________________________________    Chief Complaint: Complained of shortness of breath overnight but no longer shortness of breath  24 Hour Events: Patient hypoxic overnight with increased work of breathing, was placed on bipap  Remains on vasopressor and levophed, levophed titrated down from 11 mcg to 6mcg   This morning, examined on Bipap  at 50% and became hypoxic to low 80s%  Bipap settings changed to 80%   Review of Systems   Constitutional: Negative for chills and fever  HENT: Negative for congestion and sore throat  Eyes: Negative for pain and visual disturbance  Respiratory: Positive for shortness of breath  Negative for cough and wheezing  Cardiovascular: Negative for chest pain and palpitations  Gastrointestinal: Negative for abdominal distention, abdominal pain and nausea  Genitourinary: Negative for difficulty urinating  Musculoskeletal: Negative for back pain, neck pain and neck stiffness  Neurological: Negative for dizziness, light-headedness and headaches  Psychiatric/Behavioral: Negative for sleep disturbance      ______________________________________________________________________    Physical Exam:   General: Lying in bed  Mild distress  HEENT: +ABY 3 brisk  [place] conjunctiva  Pink and moist oral mucosa  On bipap  Neck  Supple, trachea midline  CV: RRR, no murmurs  Pulm: Crackles throughout, diminished bases  GI: Round, distended, non-tender  Anasarca  : Henson intact, clear antonella urine  PV: PVs intact  Bilateral leg wrapped in ACE  MS: CASE, +5/+3 strength  Neuro: AAO3, follows commands, responds appropriately  ______________________________________________________________________  Vitals:    18 0526 18 0600 18 0700 18 0724   BP:  102/57 104/59    BP Location:       Pulse:  101 89    Resp:  (!) 23 22    Temp:  98 4 °F (36 9 °C) 98 2 °F (36 8 °C)    TempSrc:       SpO2:  (!) 86% 93% (!) 89%   Weight: 133 kg (293 lb 6 9 oz) 133 kg (293 lb 3 4 oz)     Height:           Temperature:   Temp (24hrs), Av 4 °F (36 9 °C), Min:98 2 °F (36 8 °C), Max:98 6 °F (37 °C)    Current Temperature: 98 2 °F (36 8 °C)  Weights:   IBW: 68 4 kg    Body mass index is 44 58 kg/m²    Weight (last 2 days)     Date/Time   Weight    18 0600  133 (293 21)    18 0526  133 (293 43)    12/18/18 0900  131 (289 02)    12/18/18 0629  131 (288 8)            Hemodynamic Monitoring:  N/A     Non-Invasive/Invasive Ventilation Settings:  Respiratory    Lab Data (Last 4 hours)      12/20 0746            pH, Arterial       (!)7 330           pCO2, Arterial       (!)54 0           pO2, Arterial       (!)61 8           HCO3, Arterial       27 8           Base Excess, Arterial       0 6                O2/Vent Data       12/20 0724   Most Recent        Non-Invasive Ventilation Mode BiPAP  BiPAP                Lab Results   Component Value Date    PHART 7 330 (L) 12/20/2018    HEL3ZQB 54 0 (H) 12/20/2018    PO2ART 61 8 (L) 12/20/2018    ECU5ZSL 27 8 12/20/2018    BEART 0 6 12/20/2018    SOURCE Line, Arterial 12/20/2018     SpO2: SpO2: (!) 89 %    Intake and Outputs:  I/O       12/18 0701 - 12/19 0700 12/19 0701 - 12/20 0700 12/20 0701 - 12/21 0700    I V  (mL/kg) 2890 (22 1) 1883 6 (14 2)     IV Piggyback 3770 8      Total Intake(mL/kg) 6660 8 (50 8) 1883 6 (14 2)     Urine (mL/kg/hr) 1350 (0 4) 1655 (0 5)     Total Output 1350 1655      Net +5310 8 +228 6                 UOP: 60 ml/hr   Nutrition:        Diet Orders            Start     Ordered    12/19/18 1504  Diet Cardiovascular; Sodium 2 GM; Fluid Restriction 1800 ML, Consistent Carbohydrate Diet Level 2 (5 carb servings/75 grams CHO/meal)  Diet effective now     Question Answer Comment   Diet Type Cardiovascular    Cardiac Sodium 2 GM    Other Restriction(s): Fluid Restriction 1800 ML    Other Restriction(s): Consistent Carbohydrate Diet Level 2 (5 carb servings/75 grams CHO/meal)    RD to adjust diet per protocol?  No        12/19/18 1503    12/18/18 0930  Room Service  Once     Question:  Type of Service  Answer:  Room Service - Appropriate with Assistance    12/18/18 0929          Labs:     Results from last 7 days  Lab Units 12/20/18  0328 12/19/18  0327 12/18/18  0641 12/18/18  0638 12/18/18  0637   WBC Thousand/uL 8 41 11 63* 4 68  --   -- HEMOGLOBIN g/dL 16 4 16 4 17 3*  --   --    I STAT HEMOGLOBIN g/dl  --   --   --  18 4* 18 4*   HEMATOCRIT % 47 0 46 5 48 1  --   --    HEMATOCRIT, ISTAT %  --   --   --  54* 54*   PLATELETS Thousands/uL 186 222 211  --   --    NEUTROS PCT %  --   --  81*  --   --    BANDS PCT % 10* 11*  --   --   --    MONOS PCT %  --   --  7  --   --    MONO PCT % 3* 4  --   --   --        Results from last 7 days  Lab Units 12/20/18  0328 12/19/18  0327 12/18/18  0641 12/18/18  0638 12/18/18  0637   SODIUM mmol/L 139 138 140  --   --    POTASSIUM mmol/L 4 3 4 4 4 0  --   --    CHLORIDE mmol/L 104 102 100  --   --    CO2 mmol/L 26 25 30  --   --    CO2, I-STAT mmol/L  --   --   --  32 35*   AGAP mmol/L  --   --   --  13  --    ANION GAP mmol/L 9 11 10  --   --    BUN mg/dL 76* 77* 84*  --   --    CREATININE mg/dL 1 11 1 26 1 57*  --   --    CALCIUM mg/dL 8 9 8 4 8 5  --   --    ALT U/L  --   --  48  --   --    AST U/L  --   --  60*  --   --    ALK PHOS U/L  --   --  160*  --   --    ALBUMIN g/dL  --   --  2 7*  --   --    TOTAL BILIRUBIN mg/dL  --   --  0 80  --   --        Results from last 7 days  Lab Units 12/20/18 0328 12/19/18  0327   MAGNESIUM mg/dL 2 3 2 3   PHOSPHORUS mg/dL  --  4 5*        Results from last 7 days  Lab Units 12/18/18  0641   INR  1 16   PTT seconds 31       Results from last 7 days  Lab Units 12/18/18  2206 12/18/18  1914 12/18/18  1639 12/18/18  0641   TROPONIN I ng/mL 0 04 0 04 0 05* 0 04       Results from last 7 days  Lab Units 12/18/18  0641   LACTIC ACID mmol/L 1 7     ABG:  Lab Results   Component Value Date    PHART 7 330 (L) 12/20/2018    XVG3XCX 54 0 (H) 12/20/2018    PO2ART 61 8 (L) 12/20/2018    JVT1LUE 27 8 12/20/2018    BEART 0 6 12/20/2018    SOURCE Line, Arterial 12/20/2018     VBG:  Results from last 7 days  Lab Units 12/20/18  0746 12/19/18  0853   PH GORDY   --  7 377   PCO2 GORDY mm Hg  --  47 8   PO2 GORDY mm Hg  --  45 8*   HCO3 GORDY mmol/L  --  27 5   BASE EXC GORDY mmol/L  --  1 5   ABG SOURCE  Line, Arterial  --        Results from last 7 days  Lab Units 12/19/18  0327   PROCALCITONIN ng/ml 0 50*     No results found for: [facility]   Imaging: Pending chest xr  EKG: No new EKG  Micro: No new data    Results from last 7 days  Lab Units 12/19/18  1254 12/18/18  0706   BLOOD CULTURE   --  No Growth at 24 hrs  No Growth at 24 hrs     LEGIONELLA URINARY ANTIGEN  Negative  --    STREP PNEUMONIAE ANTIGEN, URINE  Negative  --      Allergies: No Known Allergies  Medications:   Scheduled Meds:  Current Facility-Administered Medications:  albuterol 2 5 mg Nebulization Q6H PRN Orvis Gowers, DO    azithromycin 500 mg Intravenous Q24H KIKA Magana Last Rate: 500 mg (12/19/18 1240)   cefTRIAXone 1,000 mg Intravenous Q12H KIKA Banda Last Rate: 1,000 mg (12/20/18 0544)   famotidine 20 mg Intravenous Q24H [address] Sami Amaya PA-C    heparin (porcine) 7,500 Units Subcutaneous Q8H [address] KIKA Banda    insulin lispro 2-12 Units Subcutaneous Q6H [address] KIKA Banda    nicotine 21 mg Transdermal Daily KIKA Banda    norepinephrine 1-30 mcg/min Intravenous Titrated KIKA Banda Last Rate: 6 mcg/min (12/20/18 0458)   nystatin  Topical BID KIKA Dodd    pneumococcal 13-valent conjugate vaccine 0 5 mL Intramuscular Prior to discharge Wes Joyce MD    vasopressin (PITRESSIN) in 0 9 % sodium chloride 100 mL 0 04 Units/min Intravenous Continuous KIKA Banda Last Rate: 0 04 Units/min (12/20/18 0309)     Continuous Infusions:  norepinephrine 1-30 mcg/min Last Rate: 6 mcg/min (12/20/18 0458)   vasopressin (PITRESSIN) in 0 9 % sodium chloride 100 mL 0 04 Units/min Last Rate: 0 04 Units/min (12/20/18 0309)     PRN Meds:    albuterol 2 5 mg Q6H PRN   pneumococcal 13-valent conjugate vaccine 0 5 mL Prior to discharge     VTE Pharmacologic Prophylaxis: Heparin  VTE Mechanical Prophylaxis: reason for no mechanical VTE prophylaxis bilateral leg edema  Invasive lines and devices: Invasive Devices     Central Venous Catheter Line            CVC Central Lines 12/18/18 Triple 16cm 1 day          Peripheral Intravenous Line            Peripheral IV 12/18/18 Left Forearm 2 days          Arterial Line            Arterial Line 12/18/18 Left Radial 1 day          Drain            Urethral Catheter Temperature probe 16 Fr  2 days                     Portions of the record may have been created with voice recognition software  Occasional wrong word or "sound a like" substitutions may have occurred due to the inherent limitations of voice recognition software  Read the chart carefully and recognize, using context, where substitutions have occurred      KIKA Moreno

## 2018-12-20 NOTE — PROGRESS NOTES
Progress Note - Cardiology   Saumya Salcido 80 y o  male MRN: [id number]  Unit/Bed#:  Encounter: [id number]  12/20/18  9:39 AM          Impression and Plan:    60-year-old with long-standing diabetes, hypertension, chronic and ongoing tobacco use, chronic lower extremity edema for which he was on loop diuretic-torsemide versus furosemide, has been experiencing increasing lower extremity edema and shortness of breath  Patient has always been reluctant to seek medical care      He was admitted having been found down by his wife with a blood glucose level less than 20, hypotensive at presentation, currently on 2 pressors      On further questioning, he did admit to increasing shortness of breath with exertion recently but no a typical anginal symptoms      He underwent an echocardiogram yesterday that showed an ejection fraction of 45% and mild right ventricular dilatation with mild right ventricular dysfunction      On exam, he does have significant and chronic lower extremity and abdominal wall edema with eczematous changes-a attesting to its chronicity  He is just off BiPAP and on a high-flow nasal cannula  All extremities are warm to exam   Has mild leukocytosis, proBNP of 8000, albumin of 2 5 troponin that was essentially negative      Plan:     Shock: This is not cardiogenic  Likely vasodilatory shock based on his exam-in spite of being on pressors, is warm to touch, also with leukocytosis and hypothermia at presentation  His LV dysfunction does impair his cardiac reserve  Remains on BiPAP  Suspicion for PE is low as per the primary team   No signs of acute hemodynamically significant PE on echocardiogram   Continue broad-spectrum antibiotics and evaluation for a source of infection  Acute respiratory failure:  Likely secondary to volume overload/pleural effusions and pulmonary edema  Volume removal will help    Does have severe abdominal distention pushing up his diaphragm which also impairs his  pulmonary reserve      Lower extremity edema, dyspnea with exertion and elevated proBNP:  Likely chronic-newly diagnosed systolic and diastolic congestive heart failure  Eventual ischemic evaluation might be warranted  Hold off at this time  Does need large volume aggressive diuresis, once he is off pressors, this could be instituted      Acute kidney injury:  Improving with fluids        Atrial fibrillation:   this is a new diagnosis, currently on IV heparin  Overall rates are acceptable to the current situation  Would be a poor candidate for Coumadin low, long-term  Renal function seems to be improving, would be a candidate for a no act if no procedures are scheduled and his clinical profile improves     Tobacco cessation      ===================================================================    Chief Complaint:   Chief Complaint   Patient presents with    Hypoglycemia - Symptomatic     Pt arrives via EMS with reports of Hypoglycemia, BS was 55 per EMS, given 25g of D10  EMS reports Pt was apneic with crakles at the bases  Pt BS in triage was <20, Provider aware           Subjective/Objective     Subjective:  Denies any symptoms although difficult to communicate due to being on BiPAP    Objective: Comfortable , no distress at the time of exam      Patient Active Problem List   Diagnosis    Diabetes mellitus ([facility] [address] [facility]    Heavy tobacco smoker    Hypoglycemia    Hypothermia    Somnolence    Shock ([address]    A-fib ([facility] [address] [facility]    Acute respiratory insufficiency    Hypotension    Venous insufficiency of both lower extremities, suspected       Vitals: /59   Pulse 89   Temp 98 2 °F (36 8 °C)   Resp 22   Ht 5' 8" (1 727 m)   Wt 133 kg (293 lb 3 4 oz)   SpO2 (!) 89%   BMI 44 58 kg/m²     I/O this shift:  In: -   Out: 75 [Urine:75]  Wt Readings from Last 3 Encounters:   12/20/18 133 kg (293 lb 3 4 oz)       Intake/Output Summary (Last 24 hours) at 12/20/18 0989  Last data filed at 12/20/18 5129 Gross per 24 hour   Intake          1400 29 ml   Output             1480 ml   Net           -79 71 ml     I/O last 3 completed shifts:   In: 3643 2 [I V :3393 2; IV Piggyback:250]  Out: 2600 [Urine:2600]    Invasive Devices     Central Venous Catheter Line            CVC Central Lines 12/18/18 Triple 16cm 1 day          Peripheral Intravenous Line            Peripheral IV 12/18/18 Left Forearm 2 days          Arterial Line            Arterial Line 12/18/18 Left Radial 1 day          Drain            Urethral Catheter Temperature probe 16 Fr  2 days                  Physical Exam:  GEN: Spencer Katz appears ill, alert and oriented x 3, pleasant and cooperative   HEENT: pupils equal, round, and reactive to light; extraocular muscles intact  NECK: supple, no carotid bruits or JVD  HEART: irregular rhythm, normal S1 and S2, no murmur, no clicks, gallops or rubs   LUNGS: clear to auscultation bilaterally; no wheezes or rhonchi, no rales  ABDOMEN/GI: normal bowel sounds, soft, no tenderness, no distention  EXTREMITIES/Musculoskeltal: peripheral pulses normal; no clubbing, cyanosis, chronic lower extremity edema with erythema and sloughing  NEURO: no focal motor findings   SKIN: normal without suspicious lesions on exposed skin              Lab Results:     Results from last 7 days  Lab Units 12/18/18  2206 12/18/18  1914 12/18/18  1639   TROPONIN I ng/mL 0 04 0 04 0 05*     Results from last 7 days  Lab Units 12/20/18  0328 12/19/18  0327 12/18/18  0641   WBC Thousand/uL 8 41 11 63* 4 68   HEMOGLOBIN g/dL 16 4 16 4 17 3*   HEMATOCRIT % 47 0 46 5 48 1   PLATELETS Thousands/uL 186 222 211           Results from last 7 days  Lab Units 12/20/18  0328 12/19/18  0327 12/18/18  0641 12/18/18  0638 12/18/18  0637   POTASSIUM mmol/L 4 3 4 4 4 0  --   --    CHLORIDE mmol/L 104 102 100  --   --    CO2 mmol/L 26 25 30  --   --    CO2, I-STAT mmol/L  --   --   --  32 35*   BUN mg/dL 76* 77* 84*  --   --    CREATININE mg/dL 1 11 1 26 1 57*  --   --    CALCIUM mg/dL 8 9 8 4 8 5  --   --    ALK PHOS U/L  --   --  160*  --   --    ALT U/L  --   --  48  --   --    AST U/L  --   --  60*  --   --    GLUCOSE, ISTAT mg/dl  --   --   --  21* <20*     Results from last 7 days  Lab Units 12/18/18  0641   INR  1 16       Imaging: I have personally reviewed pertinent reports  EKG/Telemtry:  Remains in atrial fibrillation    Scheduled Meds:    Current Facility-Administered Medications:  albuterol 2 5 mg Nebulization Q6H PRN Kaylah Garber, DO    azithromycin 500 mg Intravenous Q24H KIKA Bustamante Last Rate: 500 mg (12/19/18 1240)   cefTRIAXone 1,000 mg Intravenous Q12H Earnstine Gaby, CRNP Last Rate: 1,000 mg (12/20/18 0544)   famotidine 20 mg Intravenous Q24H [facility] Manoj Funk PA-C    heparin (porcine) 7,500 Units Subcutaneous [facility] Earnstine Gaby, CRNP    insulin lispro 2-12 Units Subcutaneous Q6H [facility] Earnstine Gaby, CRNP    nicotine 21 mg Transdermal Daily Earnstine Gaby, CRNP    norepinephrine 1-30 mcg/min Intravenous Titrated Earnstine Gaby, CRNP Last Rate: 6 mcg/min (12/20/18 0458)   nystatin  Topical BID SULEMA BustamanteNP    pneumococcal 13-valent conjugate vaccine 0 5 mL Intramuscular Prior to discharge Latonia Godinez MD    vasopressin (PITRESSIN) in 0 9 % sodium chloride 100 mL 0 04 Units/min Intravenous Continuous Earnstine Gaby, CRNP Last Rate: 0 04 Units/min (12/20/18 0309)     Continuous Infusions:    norepinephrine 1-30 mcg/min Last Rate: 6 mcg/min (12/20/18 0458)   vasopressin (PITRESSIN) in 0 9 % sodium chloride 100 mL 0 04 Units/min Last Rate: 0 04 Units/min (12/20/18 0309)       VTE Pharmacologic Prophylaxis: Heparin  VTE Mechanical Prophylaxis: sequential compression device      Counseling / Coordination of Care  Total time spent 20 minutes including teaching and family updates  More than 50% was spent counseling pt and family

## 2018-12-20 NOTE — CONSULTS
Consult Note - Wound   Phoebe Shukla 80 y o  male MRN: [id number]  Unit/Bed#:  Encounter: [id number]      Assessment:   Pt  Admitted with increased SOB, increased lower extremity swelling  Pt  Does not see a podiatrist    1  On Bi-Pap  Difficulty communicating  Pt  Does not tolerate removing bipap very briefly to communicate  2  Pt  Unable to tolerate lying flat so wound nurse could assess sacrum  Wound nurse will follow-up at a later date for sacral assessment  3   According to staff nurse, Clari Martinez, pt  Has to vertical linear 'slits" on sacrum  Staff currently applying calazime and then a foam dressing-which is appropriate  Wound care nurse recommends continuing calazime and foam dressing to sacrum  4  Multiple skin tears on upper extremities  Staff applying xeroform  Recommend either xeroform  Or dermagran  Xeroform dressings needs to be changed every day  Dermagran dressings need to be changed every other day  5  Of greatest significance is the condition of the patient's bilateral lower legs  Both legs are flaming red  Extremely dry, kerototic skin  Multiple fissures located on both heels, toes, and feet  Scattered, partial thickness wounds on both legs  Right leg with more open areas than left leg  Drainage from lower legs is actually small  Drainage from skin tears due to swelling on right upper thigh and left knee area is a more profuse large amt  Of serous drainage  6  Intertriginous dermatitis bilat groins and underneath abd pannus  Scattered open areas present  Moderate amt  Of serous/sang drainage  Abdomen is grossly distended due to swelling  Plan:   Care performed: Both lower legs, feet, and toes cleansed with foam cleanser and dried  Hydraguard applied to intact skin  Xeroform gauze applied to open areas on left and right legs  Covered with ABD pads and then wrapped with kerlix roll  Maxorb placed on draining wounds on right inner thigh and left knee area   No secondary dressing applied as pt  Is non-mobile and want to minimize skin damage  Recommend:   Consider podiatry consult  Amlactin ordered as scheduled QD to intact skin on bilat lower legs, feet but not between toes  Apply when doing daily dressing changes to lower legs  Xeroform gauze to bilat lower legs, covered with ABD pads and kerlix roll  Change q day and prn  Maxorb to highly draining wounds on right inner thigh and left knee area  Continue Calazime and bordered foam dressing to sacrum  Change q 3 days and prn  Dermagran to arm skin tears  Cover with dry gauze and kerlix roll  Change q 2 days and prn  Continue Nystatin powder and ABD pads in groins, abd folds as ordered  Moisturize skin daily after bathing with blue top moisturizing lotion  Frequent repositioning as pt  Tolerates  Elevate heels  Soft Chair cushion when pt  Out of bed   Wound care follow up q week and prn      Vitals: Blood pressure 100/56, pulse 88, temperature 98 4 °F (36 9 °C), resp  rate 17, height 5' 8" (1 727 m), weight 133 kg (293 lb 3 4 oz), SpO2 96 %  ,Body mass index is 44 58 kg/m²  Wound 12/18/18 Skin tear Arm Left (Active)   Wound Description DAVE 12/20/2018  8:00 AM   Drainage Amount Moderate 12/20/2018  8:00 AM   Drainage Description Yellow;Serous 12/20/2018  8:00 AM   Treatments Elevated 12/20/2018  8:00 AM   Dressing Vaseline gauze;Dry dressing 12/20/2018  8:00 AM   Dressing Status Intact; Old drainage 12/20/2018  8:00 AM       Wound 12/18/18 Other (Comment) Leg scattered open wounds b/l legs (Active)   Wound Description Beefy red;Drainage;Pink 12/20/2018 10:30 AM   Tunneling 0 cm 12/20/2018 10:30 AM   Undermining 0 12/20/2018 10:30 AM   Drainage Amount Other (Comment) 12/20/2018 10:30 AM   Drainage Description Serous 12/20/2018 10:30 AM   Non-staged Wound Description Partial thickness 12/20/2018 10:30 AM   Treatments Site care;Elevated 12/20/2018  8:00 AM   Dressing Dry dressing 12/20/2018  8:00 AM   Dressing Changed New 12/20/2018  8:00 AM   Dressing Status Clean;Dry; Intact 12/20/2018  8:00 AM       Wound 12/19/18 Moisture associated skin damage Groin Right;Left skin breakdown under pannus (Active)   Wound Description Beefy red 12/20/2018 10:30 AM   Mylene-wound Assessment Swelling 12/20/2018 10:30 AM   Tunneling 0 cm 12/20/2018 10:30 AM   Undermining 0 12/20/2018 10:30 AM   Drainage Amount Large 12/20/2018 10:30 AM   Drainage Description Serosanguineous 12/20/2018 10:30 AM   Non-staged Wound Description Partial thickness 12/20/2018 10:30 AM   Treatments Site care 12/20/2018  8:00 AM   Dressing Dry dressing 12/20/2018  8:00 AM   Dressing Changed New 12/20/2018  8:00 AM   Patient Tolerance Tolerated well 12/19/2018  4:00 PM   Dressing Status Clean;Dry; Intact 12/20/2018  8:00 AM       Wound 12/19/18 Skin tear Scrotum Posterior (Active)   Wound Description Fragile 12/20/2018  8:00 AM   Mylene-wound Assessment Erythema 12/20/2018  8:00 AM   Treatments Cleansed;Site care 12/20/2018  8:00 AM   Dressing Other (Comment) 12/20/2018  8:00 AM   Dressing Status Other (Comment) 12/20/2018  8:00 AM

## 2018-12-21 PROBLEM — I50.42 CHRONIC COMBINED SYSTOLIC AND DIASTOLIC CONGESTIVE HEART FAILURE (HCC): Status: ACTIVE | Noted: 2018-01-01

## 2018-12-21 PROBLEM — R65.21 SEPTIC SHOCK (HCC): Status: ACTIVE | Noted: 2018-01-01

## 2018-12-21 PROBLEM — N17.9 AKI (ACUTE KIDNEY INJURY) (HCC): Status: ACTIVE | Noted: 2018-01-01

## 2018-12-21 PROBLEM — G93.41 ACUTE METABOLIC ENCEPHALOPATHY: Status: ACTIVE | Noted: 2018-01-01

## 2018-12-21 PROBLEM — A41.9 SEPTIC SHOCK (HCC): Status: ACTIVE | Noted: 2018-01-01

## 2018-12-21 PROBLEM — J96.01 ACUTE RESPIRATORY FAILURE WITH HYPOXIA AND HYPERCAPNIA (HCC): Status: ACTIVE | Noted: 2018-01-01

## 2018-12-21 PROBLEM — I48.0 PAROXYSMAL ATRIAL FIBRILLATION (HCC): Status: ACTIVE | Noted: 2018-01-01

## 2018-12-21 PROBLEM — J96.02 ACUTE RESPIRATORY FAILURE WITH HYPOXIA AND HYPERCAPNIA (HCC): Status: ACTIVE | Noted: 2018-01-01

## 2018-12-21 PROBLEM — D72.825 BANDEMIA: Status: ACTIVE | Noted: 2018-01-01

## 2018-12-21 NOTE — PROGRESS NOTES
Progress Note - Critical Care   Patti Hendricks 80 y o  male MRN: [id number]  Unit/Bed#:  Encounter: [id number]    ------------------------------------------------------------------------------------------------  Chief Complaint: Intuabted/sedated     HPI/24hr events: Patient was on BiPAP throughout the day and evening yesterday  He continued to be hypoxic and hypercapnic on ABG  He stated that he was becoming somewhat more short of breath, and he became progressively more confused  He was ultimately intubated without complication  He was on a Levophed drip throughout the day, which required increasing throughout the night  He was started on stress dose steroids  He was initially given 100 mg IV Lasix and started on a drip, however his urine output remained minimal and was quite concentrated  He was then given 12 5 g 5% albumin and 1 L IV isolyte  ----------------------------------------------------------------------------------------------  Assessment and Plan  Principal Problem:    Septic shock ([facility] 75 )  Active Problems:    Acute respiratory failure with hypoxia and hypercapnia (HCC)    Acute metabolic encephalopathy    CONG (acute kidney injury) ([facility] 75 )    Chronic combined systolic and diastolic congestive heart failure (HCC)    Hypoglycemia    Paroxysmal atrial fibrillation (HCC)    Bandemia    Type 2 diabetes mellitus without complication, without long-term current use of insulin (HCC)    Hypothermia    Heavy tobacco smoker    Venous insufficiency of both lower extremities, suspected  Resolved Problems:    Elevated troponin      Neuro:   · Acute metabolic encephalopathy - multifactorial secondary to hypercapnia, uremia, hypotension, and sepsis  Continue routine neuro checks  Sleep/wake cycle regulation  CAM-ICU daily  · Sedation/analgesia - avoiding propofol secondary to hypotension    Continue fentanyl drip at 50 mcg/hour, Precedex titrated to goal RASS 0 to -1, IV fentanyl PRN for agitation and/or pain      CV:   · Septic shock - continue vasopressin and Levophed drips titrated to maintain goal MAP > 65  Stress dose steroids added overnight  CVP 11-12  If patient remains in sinus rhythm, can continue initiating flow trac monitoring  · Chronic combined systolic diastolic CHF - cardiology consulted, appreciate recommendations  Recent echo from 12/18 revealed EF 40-45% with grade 1 diastolic dysfunction  RV also noted to be dilated with mildly reduced function  Will need diuresis going forward, however given severe shock, will hold off aggressive diuresis at this time  · Paroxysmal AFib - currently normal sinus rhythm with occasional PACs and PVCs  Continue close telemetry monitoring  Optimize electrolytes K > 4 0, Mag > 2 0  Holding off beta-blockers at this time secondary to hypotension  Should patient go into AFib with RVR, can consider renally dosed digoxin  Not currently on systemic anticoagulation  Pulm:  · Acute hypoxic hypercapnic respiratory failure - patient intubated yesterday  Continue mechanical ventilation  Continue Xopenex/Atrovent q 6 hours  Suction as needed and closely monitor secretions  Maintain HOB >30 degrees  Q4h oral care with chlorhexidine BID  Monitor peak and plateau airway pressures  Maintain Ppeak < 45cm H2O, Pplat < 30cm H2O  Total volume currently between 6 and 7 ml/kg ideal body weight in order to best optimize pCO2 while maintaining lung protective ventilation  Maintain SpO2 88-92%  · Tobacco abuse - nicotine patch daily, will provide cessation education when patient is extubated      GI:   · Elevated total bili - likely secondary to sepsis  Continue to monitor CMP  Can consider RUQ US if remains elevated   · Stress ulcer prophylaxis - famotidine IV 20 mg Q 24 hr secondary to creatinine clearance < 50    · Initiate bowel regimen        :   · CONG - baseline creatinine unknown  Creatinine on admission 1 57, however he improved to 1  11   Up to 1 75 yesterday evening and somewhat improved to 1 51 this AM   Despite aggressive diuresis attempts, patient's urine output remained minimal with worsening renal function tests  Will continue to keep patient adequately resuscitated maintain renal perfusion pressures  Maintain Henson  Strict q2h I/O monitoring  Should patient continue to remain oliguric with worsening electrolytes and BUN, may need to consider Nephrology input  Continue to follow renal function tests  F/E/N:   · No maintenance IV fluids  · Replete electrolytes with as needed to maintain K >4 0, Mag >2 0, Phos >3 0  · Diet: NPO secondary to high vasopressor requirements     Heme/Onc:   · No acute issues  Patient's hemoglobin on admission was 17 3, favoring hypovolemia  Hemoglobin 15 9 this AM   No sign of active bleeding  Continue to trend hemoglobin and platelets  · VTE prophylaxis -  sub-q heparin TID, no mechanical VTE prophylaxis secondary to venous stasis and ulcers of the lower extremities  Endo:   · Type 2 diabetes with hypoglycemia- patient only on metformin as an outpatient which is currently being held  Patient was hypoglycemic previously  No insulin required at this time, however now the patient is on steroids only to monitor for steroid-induced hyperglycemia  Add sliding scale insulin as needed in order to maintain goal -180  ID:   · Septic shock of unclear etiology - present on admission  Blood cultures from admission negative to date  Flu PCR and MRSA culture were sent and pending  UA did not reflex to culture  Obtain bronchial cultures if bronchoscopy is performed today  Given worsening clinical status, will dose with vancomycin now  Will hold off scheduling given patient's worsening renal function  Patient is currently on ceftriaxone/Flagyl  Could broaden patient is cefepime, however he is only lacking pseudomonal and Enterobacter coverage, which he does not have any apparent risk factors for   Follow up cultures  If patient does not continue to improve, may want to consider fungal culture given that patient had lower extremity wounds that did appear candidal  Continue to monitor fever and WBC curve  MSK/Skin:   · Bilateral lower extremity venous insufficiency with wounds - present on admission  Wound consulted, appreciate recommendations  Continue daily dressing changes  · Reposition q2h, eliminate pressure points while in bed  · Close skin surveillance       Dispo:  Critical care  Patient's wife was contacted overnight and informed about intubation  All questions were answered  Continue daily updates  Code Status: Level 1 - Full Code  ---------------------------------------------------------------------------------------  Physical Exam   Constitutional: He appears well-developed  He appears lethargic  He is easily aroused  He is sedated, intubated and restrained  HENT:   Head: Normocephalic and atraumatic  Mouth/Throat: Mucous membranes are pale  Eyes: EOM are normal  No scleral icterus  Neck: Neck supple  No JVD present  Cardiovascular: Normal heart sounds  An irregular rhythm present  Frequent extrasystoles are present  Tachycardia present  Pulses:       Radial pulses are 2+ on the right side, and 2+ on the left side  Popliteal pulses are 1+ on the right side, and 1+ on the left side  Right dorsalis pedis pulse not accessible and left dorsalis pedis pulse not accessible  Pulmonary/Chest: Tachypnea noted  He is intubated  He has decreased breath sounds in the right lower field, the left upper field, the left middle field and the left lower field  He has no wheezes  He has no rhonchi  Scant secretions with suctioning    Abdominal: Soft  He exhibits no distension  Bowel sounds are decreased  There is no tenderness     Genitourinary:   Genitourinary Comments: Henson: dark, tea-colored urine    Feet:   Right Foot:   Skin Integrity: Positive for ulcer, skin breakdown and dry skin    Left Foot:   Skin Integrity: Positive for ulcer and dry skin  Neurological: He is easily aroused  He appears lethargic  GCS eye subscore is 3  GCS verbal subscore is 1  GCS motor subscore is 6  MAEx4   Skin: Skin is warm and intact  Capillary refill takes 2 to 3 seconds  He is diaphoretic      ------------------------------------------------------------------------------------------  Review of Systems   Unable to perform ROS: Intubated     ------------------------------------------------------------------------------------------    Vitals   Vitals:    18 0200 18 0300 18 0400 18 0423   BP: 110/57 109/55 108/65    BP Location:  Right arm     Pulse: 89 93 89    Resp: (!) 0 21 22    Temp:  98 3 °F (36 8 °C)     TempSrc:  Axillary     SpO2:  94%  95%   Weight:       Height:         Temp (24hrs), Av 5 °F (36 9 °C), Min:98 2 °F (36 8 °C), Max:99 9 °F (37 7 °C)  Current: Temperature: 99 9 °F (37 7 °C)  HR: 88 - 122  BP: 85/40 - 11/58  MAP: 65 - 85  CVP: 10 - 18  RR: 12 - 32  SpO2: 89 - 98%     Ventilator Settings:  AV/VC    Settings  Resp Rate (BPM): 20 BPM  Vt (mL): 450 mL  FIO2 (%): 60 %  PEEP (cmH2O): 10 cmH2O  Flow (LPM): 60 L/min    Observed  Resp Rate (BPM): 21 BPM  VT (mL): 485  MV: 10 2  Peak Pressure (cmH2O): 25 cmH2O  Plateau Pressure (cm H2O): 23 cm H2O  I/E Ratio (Obs): 1:1 5   Static Compliance (mL/cmH20): 4 mL/cmH2O    Height and Weights   Height: 5' 8" (172 7 cm)  IBW: 68 4 kg  Body mass index is 44 58 kg/m²    Weight (last 2 days)     Date/Time   Weight    18 0600  133 (293 21)    18 0526  133 (293 43)            Intake and Output  I/O        07 -  0700  07 -  0700    I V  (mL/kg) 1883 6 (14 2) 1926 4 (14 5)    IV Piggyback  500    Total Intake(mL/kg) 1883 6 (14 2) 2426 4 (18 2)    Urine (mL/kg/hr) 1655 (0 5) 466 (0 1)    Emesis/NG output  0    Total Output 1655 466    Net +228 6 +1960 4              UOP: 0 - 45 ml/hr Nutrition       Diet Orders            Start     Ordered    12/21/18 0048  Diet NPO; Sips with meds  Diet effective now     Question Answer Comment   Diet Type NPO    NPO Except: Sips with meds    RD to adjust diet per protocol? No        12/21/18 0047          Laboratory and Diagnostics:    Results from last 7 days  Lab Units 12/20/18 2139 12/20/18 0328 12/19/18  0327 12/18/18  0641 12/18/18  0638 12/18/18  0637   WBC Thousand/uL  --  8 41 11 63* 4 68  --   --    HEMOGLOBIN g/dL  --  16 4 16 4 17 3*  --   --    I STAT HEMOGLOBIN g/dl 17 3*  --   --   --  18 4* 18 4*   HEMATOCRIT %  --  47 0 46 5 48 1  --   --    HEMATOCRIT, ISTAT % 51*  --   --   --  54* 54*   PLATELETS Thousands/uL  --  186 222 211  --   --    NEUTROS PCT %  --   --   --  81*  --   --    BANDS PCT %  --  10* 11*  --   --   --    MONOS PCT %  --   --   --  7  --   --    MONO PCT %  --  3* 4  --   --   --        Results from last 7 days  Lab Units 12/20/18 2246 12/20/18 2139 12/20/18 0328 12/19/18  0327 12/18/18  0641  12/18/18  0638 12/18/18  0637   SODIUM mmol/L 141  --  139 138 140  --   --   --    POTASSIUM mmol/L 4 7  --  4 3 4 4 4 0  < >  --   --    CHLORIDE mmol/L 104  --  104 102 100  --   --   --    CO2 mmol/L 26  --  26 25 30  --   --   --    CO2, I-STAT mmol/L  --  31  --   --   --   --  32 35*   AGAP mmol/L  --   --   --   --   --   --  13  --    ANION GAP mmol/L 11  --  9 11 10  --   --   --    BUN mg/dL 87*  --  76* 77* 84*  --   --   --    CREATININE mg/dL 1 75*  --  1 11 1 26 1 57*  --   --   --    CALCIUM mg/dL 9 2  --  8 9 8 4 8 5  --   --   --    ALT U/L  --   --   --   --  48  --   --   --    AST U/L  --   --   --   --  60*  --   --   --    ALK PHOS U/L  --   --   --   --  160*  --   --   --    ALBUMIN g/dL  --   --   --   --  2 7*  --   --   --    TOTAL BILIRUBIN mg/dL  --   --   --   --  0 80  --   --   --    < > = values in this interval not displayed      Results from last 7 days  Lab Units 12/20/18  0328 12/19/18  0327 MAGNESIUM mg/dL 2 3 2 3   PHOSPHORUS mg/dL  --  4 5*        Results from last 7 days  Lab Units 12/18/18  0641   INR  1 16   PTT seconds 31       Results from last 7 days  Lab Units 12/18/18  2206 12/18/18  1914 12/18/18  1639 12/18/18  0641   TROPONIN I ng/mL 0 04 0 04 0 05* 0 04       Results from last 7 days  Lab Units 12/18/18  0641   LACTIC ACID mmol/L 1 7     ABG:  Lab Results   Component Value Date    PHART 7 250 (L) 12/20/2018    FEM0DEO 60 6 (HH) 12/20/2018    PO2ART 64 9 (L) 12/20/2018    NKZ4EAS 26 0 12/20/2018    BEART -2 7 12/20/2018    SOURCE Line, Arterial 12/20/2018       Results from last 7 days  Lab Units 12/20/18  1107 12/19/18  0327   PROCALCITONIN ng/ml 1 85* 0 50*        EKG: NSR with paroxsymal a fib   Imaging: I have personally reviewed pertinent reports  and I have personally reviewed pertinent films in PACS    Micro    Results from last 7 days  Lab Units 12/19/18  1254 12/18/18  0706   BLOOD CULTURE   --  No Growth at 48 hrs  No Growth at 48 hrs     LEGIONELLA URINARY ANTIGEN  Negative  --    STREP PNEUMONIAE ANTIGEN, URINE  Negative  --        Allergies   No Known Allergies    Active Medications  Scheduled Meds:  Current Facility-Administered Medications:  albuterol 2 5 mg Nebulization Q6H PRN Murlene WELLINGTON Dawson    azithromycin 500 mg Intravenous Q24H Caitlin Cavazos PA-C    cefTRIAXone 1,000 mg Intravenous Q12H Caitlin Cavazos PA-C Last Rate: 1,000 mg (12/20/18 1730)   chlorhexidine 15 mL Swish & Spit Q12H [facility] KIKA Amezquita    dexmedetomidine (PRECEDEX) 200 mcg in 50 ml sodium chloride 0 9% 0 1-0 7 mcg/kg/hr Intravenous Titrated Aura Castles, CRNP Last Rate: 0 4 mcg/kg/hr (12/20/18 2340)   famotidine 20 mg Intravenous Q24H [facility] Caitlin Cavazos PA-C    fentaNYL 50 mcg/hr Intravenous Continuous Aura Castles, CRNP Last Rate: 50 mcg/hr (12/20/18 2226)   fentanyl citrate (PF) 50 mcg Intravenous Q1H PRN Aura KIKA Siegel    furosemide 20 mg/hr Intravenous Continuous Randeeel KIKA Moore Last Rate: 20 mg/hr (12/20/18 2313)   heparin (porcine) 5,000 Units Subcutaneous [facility] Caitlin Cavazos PA-C    hydrocortisone sodium succinate 50 mg Intravenous Q6H KIKA Amezquita    insulin lispro 2-12 Units Subcutaneous Q6H [address] Caitlin Cavazos PA-C    ipratropium 0 5 mg Nebulization Q6H Theresa Lozano, DO    levalbuterol 1 25 mg Nebulization Q6H Theresa Marta, DO    metroNIDAZOLE 500 mg Intravenous Q8H Caitlin Cavazos PA-C Last Rate: 500 mg (12/20/18 1850)   nicotine 7 mg Transdermal Daily Caitlin Cavazos PA-C    norepinephrine 1-30 mcg/min Intravenous Titrated Cornelius Rell, PA-C Last Rate: 20 mcg/min (12/20/18 2350)   nystatin  Topical BID Cornelius Rell, PA-C    pneumococcal 13-valent conjugate vaccine 0 5 mL Intramuscular Prior to discharge Cornelius Rell PA-C    vasopressin (PITRESSIN) in 0 9 % sodium chloride 100 mL 0 04 Units/min Intravenous Continuous Cornelius Rell, PA-C Last Rate: 0 04 Units/min (12/20/18 1904)     Continuous Infusions:    dexmedetomidine (PRECEDEX) 200 mcg in 50 ml sodium chloride 0 9% 0 1-0 7 mcg/kg/hr Last Rate: 0 4 mcg/kg/hr (12/20/18 2340)   fentaNYL 50 mcg/hr Last Rate: 50 mcg/hr (12/20/18 2226)   furosemide 20 mg/hr Last Rate: 20 mg/hr (12/20/18 2313)   norepinephrine 1-30 mcg/min Last Rate: 20 mcg/min (12/20/18 2350)   vasopressin (PITRESSIN) in 0 9 % sodium chloride 100 mL 0 04 Units/min Last Rate: 0 04 Units/min (12/20/18 1904)     PRN Meds:     albuterol 2 5 mg Q6H PRN   fentanyl citrate (PF) 50 mcg Q1H PRN   pneumococcal 13-valent conjugate vaccine 0 5 mL Prior to discharge       VTE Pharmacologic Prophylaxis: Heparin  VTE Mechanical Prophylaxis: reason for no mechanical VTE prophylaxis LE venous stasis with wound    Invasive  Devices  Invasive Devices     Central Venous Catheter Line            CVC Central Lines 12/18/18 Triple 16cm 2 days          Peripheral Intravenous Line            Peripheral IV 12/18/18 Left Forearm 2 days          Arterial Line            Arterial Line 12/18/18 Left Radial 2 days          Drain            Urethral Catheter Temperature probe 16 Fr  2 days          Airway            ETT  Hi-Lo; Cuffed 8 mm less than 1 day                Counseling / Coordination of Care  Total Critical Care time spent 55 minutes excluding procedures, teaching and family updates  KIKA Phan      Portions of the record may have been created with voice recognition software  Occasional wrong word or "sound a like" substitutions may have occurred due to the inherent limitations of voice recognition software    Read the chart carefully and recognize, using context, where substitutions have occurred

## 2018-12-21 NOTE — PROCEDURES
Intubation  Date/Time: 12/20/2018 10:05 PM  Performed by: Xu Nam  Authorized by: Xu Nam     Patient location:  Bedside  Other Assisting Provider: No    Consent:     Consent obtained:  Verbal    Consent given by:  Patient    Risks discussed:  Aspiration, dental trauma, hypoxia, laryngeal injury and death    Alternatives discussed:  No treatment and delayed treatment  Universal protocol:     Procedure explained and questions answered to patient or proxy's satisfaction: yes      Radiology Images displayed and confirmed  If images not available, report reviewed: yes      Required blood products, implants, devices, and special equipment available: yes      Site/side marked: yes      Immediately prior to procedure, a time out was called: yes      Patient identity confirmed:  Verbally with patient  Pre-procedure details:     Patient status:  Awake    Pretreatment medications:  Etomidate    Paralytics:  None  Indications:     Indications for intubation: respiratory distress, hypoxemia, hypercapnia and pulmonary toilet    Procedure details:     Preoxygenation:  BiPAP    CPR in progress: no      Intubation method:  Oral    Oral intubation technique:  Direct    Laryngoscope blade: Mac 4    Tube size (mm):  8 0    Tube type:  Cuffed    Number of attempts:  1    Cricoid pressure: no      Tube visualized through cords: yes    Placement assessment:     ETT to lip:  24cm    Tube secured with:  ETT grimm    Breath sounds:  Equal and absent over the epigastrium    Placement verification: chest rise, condensation, CXR verification, direct visualization, equal breath sounds and ETCO2 detector      CXR findings:  ETT in proper place  Post-procedure details:     Patient tolerance of procedure: Tolerated well, no immediate complications  Comments:      Mucous membranes and tongue dry  Grade I view

## 2018-12-21 NOTE — PROGRESS NOTES
Nutrition    If unable to extubate over the next 48 hours, patient to benefit from nutrition support      Tube Feed Goal:  Jevity 1 5 @ 60 ml/hr, flush 150 ml water q 4 hr    Will provide:  2160 kcal, 92 g protein, 1994 ml free water

## 2018-12-21 NOTE — PROGRESS NOTES
Vancomycin Assessment    Spencer Katz is a 80 y o  male who is currently receiving vancomycin 20mg/kg (ABW) loading, followed by 12 5mg/kg (ABW) Q12hr  for Pneumonia     Relevant clinical data and objective history reviewed:  Creatinine   Date Value Ref Range Status   12/21/2018 1 51 (H) 0 60 - 1 30 mg/dL Final     Comment:     Standardized to IDMS reference method   12/20/2018 1 75 (H) 0 60 - 1 30 mg/dL Final     Comment:     Standardized to IDMS reference method   12/20/2018 1 11 0 60 - 1 30 mg/dL Final     Comment:     Standardized to IDMS reference method     /69   Pulse 90   Temp 98 3 °F (36 8 °C) (Axillary)   Resp 22   Ht 5' 8" (1 727 m)   Wt 133 kg (293 lb 3 4 oz)   SpO2 95%   BMI 44 58 kg/m²   I/O last 3 completed shifts: In: 2630 1 [I V :2130 1; IV Piggyback:500]  Out: 1915 [Urine:1915]  Lab Results   Component Value Date/Time    BUN 86 (H) 12/21/2018 03:40 AM    WBC 10 30 (H) 12/21/2018 03:40 AM    HGB 15 9 12/21/2018 03:40 AM    HCT 45 8 12/21/2018 03:40 AM    MCV 93 12/21/2018 03:40 AM     12/21/2018 03:40 AM     Temp Readings from Last 3 Encounters:   12/21/18 98 3 °F (36 8 °C) (Axillary)     Vancomycin Days of Therapy: 1    Assessment/Plan  The patient is currently on vancomycin utilizing scheduled dosing based on adjusted body weight (due to obesity)  Baseline risks associated with therapy include: pre-existing renal impairment, concomitant nephrotoxic medications and dehydration  The patient is currently receiving 20mg/kg (ABW) loading, followed by 12 5mg/kg (ABW) Q12hr  and is clinically appropriate and dose will be continued  Pharmacy will also follow closely for s/sx of nephrotoxicity, infusion reactions and appropriateness of therapy  BMP and CBC will be ordered per protocol  Plan for trough as patient approaches steady state, prior to the 4th  dose at approximately 12/22 at 1400    Due to infection severity, will target a trough of 15-20 (appropriate for most indications)   Pharmacy will continue to follow the patients culture results and clinical progress daily      Demarco Barney, Pharmacist

## 2018-12-21 NOTE — PROCEDURES
Bronchoscopy  Date/Time: 12/21/2018 10:57 AM  Performed by: Arianne Willis  Authorized by: Arianne Willis     Patient location:  Other (comment)  Other Assisting Provider: No    Consent:     Consent obtained:  Written    Consent given by:  Guardian    Risks discussed: Adverse reaction to sedation, bleeding, death, infection, pain and pneumothorax    Alternatives discussed:  No treatment  Universal protocol:     Procedure explained and questions answered to patient or proxy's satisfaction: yes      Relevant documents present and verified: yes      Test results available and properly labeled: yes      Radiology Images displayed and confirmed  If images not available, report reviewed: yes      Required blood products, implants, devices and special equipment available: yes      Immediately prior to procedure a time out was called: yes      Patient identity confirmed:  Arm band  Indications:     Procedure Purpose: diagnostic and therapeutic      Indications: pneumonia/infiltrate    Sedation:     Sedation type:  Continuous (ICU/vent)  Anesthesia (see MAR for exact dosages): Anesthesia method:  None  Upper Airway:     Trachea: normal      Comfort:  Normal  Airway:     Airway: There were secretions noted in the trachea and R/L bronchus  There were occlusive secretions noted in the LLL and lingula  Secretions were noted in the opening to right upper, right middle and right lower lobe  Procedure details:     Description:  Occlusive secretions were cleared from the left lower lobe and lingula  The airways were patent at the completion of the procedure  The significant mucus obstructed the right upper and right middle lobe were removed with suctioning  Post-procedure details:     Chest x-ray performed: no      Patient tolerance of procedure: Tolerated well, no immediate complications    Incomplete procedure: No    Final Diagnosis/Findings:      Occlusive mucus of plugs of left lower lobe and lingula  Additional secretions noted on right side

## 2018-12-21 NOTE — PHYSICAL THERAPY NOTE
PHYSICAL THERAPY NOTE-Late entry  Patient Name: Manuel Moore  KAGXX'[id number] Date: 12/21/2018  Chart reviewed and spoke to Mahendra from critical care by phone re: PT Discontinuation orders  He reported that the patient was in respiratory distress, and not appropriate for IPPT services at this time  Recommend reconsultation of PT/OT services when/if appropriate   Onesimo Lynch, PT

## 2018-12-21 NOTE — PROGRESS NOTES
Progress Note - Cardiology   Briseida Angulo 80 y o  male MRN: [id number]  Unit/Bed#:  Encounter: [id number]  12/21/18  10:46 AM          Impression and Plan:    44-year-old with long-standing diabetes, hypertension, chronic and ongoing tobacco use, chronic lower extremity edema for which he was on loop diuretic-torsemide versus furosemide, has been experiencing increasing lower extremity edema and shortness of breath  Patient has always been reluctant to seek medical care      He was admitted having been found down by his wife with a blood glucose level less than 20, hypotensive at presentation, currently on 2 pressors      On further questioning, he did admit to increasing shortness of breath with exertion recently but no a typical anginal symptoms      He underwent an echocardiogram in the hospital that showed an ejection fraction of 45% and mild right ventricular dilatation with mild right ventricular dysfunction      On exam, he continues to have significant and chronic lower extremity and abdominal wall edema with eczematous changes-a attesting to its chronicity  Was initially on BiPAP, then needed intubation yesterday  All extremities are warm to exam   Has mild leukocytosis, proBNP of 8000, albumin of 2 5, troponin that was essentially negative      Plan:     Shock: This is not cardiogenic  Likely vasodilatory shock based on his exam-in spite of being on pressors, is warm to touch, also with leukocytosis and hypothermia at presentation  Now also with fevers  His LV dysfunction does impair his cardiac reserve  Now intubated  No signs of acute hemodynamically significant PE on echocardiogram   Continue broad-spectrum antibiotics and evaluation for a source of infection  Will undergo bronchoscopy today    Acute respiratory failure:  Likely secondary to volume overload/pleural effusions and pulmonary edema, possibly infectious pulmonary process also     Volume removal will help but now that he is intubated anyway, with decreasing oxygen requirements-currently at 60% FiO2, can hold off while optimizing his hemodynamics with fluids in the setting of sepsis,increasing pressor requirements over the last 24 hours     Does have severe abdominal distention pushing up his diaphragm which also impairs his  pulmonary reserve  Received a total of 100 mg of IV Lasix yesterday with some worsening of renal function     Lower extremity edema, dyspnea with exertion and elevated proBNP:  Likely chronic-newly diagnosed systolic and diastolic congestive heart failure  Eventual ischemic evaluation might be warranted  Hold off at this time  Does need large volume aggressive diuresis, once he is off pressors with stable hemodynamics     Acute kidney injury:  Improving with fluids        Atrial fibrillation:   this is a new diagnosis, currently on IV heparin  , currently in sinus rhythm with frequent PACs  Overall rates were acceptable even while in atrial fibrillation  Would be a poor candidate for Coumadin low, long-term  Renal function seems to be improving, would be a candidate for a no act if no procedures are scheduled and his clinical profile improves     Tobacco cessation long-term    Overall prognosis is poor       ===================================================================    Chief Complaint:   Chief Complaint   Patient presents with    Hypoglycemia - Symptomatic     Pt arrives via EMS with reports of Hypoglycemia, BS was 55 per EMS, given 25g of D10  EMS reports Pt was apneic with crakles at the bases  Pt BS in triage was <20, Provider aware           Subjective/Objective     Subjective:  Denies any symptoms although difficult to communicate due to being on BiPAP    Objective: Comfortable , no distress at the time of exam      Patient Active Problem List   Diagnosis    Type 2 diabetes mellitus without complication, without long-term current use of insulin (HCC)    Heavy tobacco smoker    Hypoglycemia    Hypothermia    Acute metabolic encephalopathy    Septic shock (HCC)    Paroxysmal atrial fibrillation (HCC)    Acute respiratory failure with hypoxia and hypercapnia (HCC)    Bandemia    Venous insufficiency of both lower extremities, suspected    CONG (acute kidney injury) ([facility] [address] )    Chronic combined systolic and diastolic congestive heart failure (HCC)       Vitals: /72   Pulse 95   Temp (!) 100 9 °F (38 3 °C)   Resp (!) 23   Ht 5' 8" (1 727 m)   Wt 131 kg (289 lb 11 oz)   SpO2 94%   BMI 44 05 kg/m²     I/O this shift:  In: -   Out: 235 [Urine:235]  Wt Readings from Last 3 Encounters:   12/21/18 131 kg (289 lb 11 oz)       Intake/Output Summary (Last 24 hours) at 12/21/18 1046  Last data filed at 12/21/18 1000   Gross per 24 hour   Intake          2362 16 ml   Output              711 ml   Net          1651 16 ml     I/O last 3 completed shifts: In: 2884 [I V :2384; IV Piggyback:500]  Out: 4830 [Urine:1281]    Invasive Devices     Central Venous Catheter Line            CVC Central Lines 12/18/18 Triple 16cm 2 days          Peripheral Intravenous Line            Peripheral IV 12/18/18 Left Forearm 3 days          Arterial Line            Arterial Line 12/18/18 Left Radial 2 days          Drain            Urethral Catheter Temperature probe 16 Fr  3 days    NG/OG/Enteral Tube Orogastric 16 Fr Center mouth less than 1 day          Airway            ETT  Hi-Lo; Cuffed 8 mm less than 1 day                  Physical Exam:  GEN: Santy De Dios appears ill, now intubated and sedated,   HEENT: pupils equal, round, and reactive to light; extraocular muscles intact  NECK: supple, no carotid bruits or JVD  HEART: irregular rhythm, normal S1 and S2, no murmur, no clicks, gallops or rubs   LUNGS: clear to auscultation bilaterally; no wheezes or rhonchi, no rales  ABDOMEN/GI: normal bowel sounds, soft, no tenderness, no distention  EXTREMITIES/Musculoskeltal: peripheral pulses normal; no clubbing, cyanosis, chronic lower extremity edema with erythema and sloughing  NEURO: no focal motor findings   SKIN: normal without suspicious lesions on exposed skin              Lab Results:     Results from last 7 days  Lab Units 12/18/18  2206 12/18/18  1914 12/18/18  1639   TROPONIN I ng/mL 0 04 0 04 0 05*       Results from last 7 days  Lab Units 12/21/18  0340 12/20/18  2139 12/20/18  0328 12/19/18  0327   WBC Thousand/uL 10 30*  --  8 41 11 63*   HEMOGLOBIN g/dL 15 9  --  16 4 16 4   I STAT HEMOGLOBIN g/dl  --  17 3*  --   --    HEMATOCRIT % 45 8  --  47 0 46 5   HEMATOCRIT, ISTAT %  --  51*  --   --    PLATELETS Thousands/uL 192  --  186 222           Results from last 7 days  Lab Units 12/21/18  0340 12/20/18  2246 12/20/18 2139 12/20/18  0328  12/18/18  0641 12/18/18  0638 12/18/18  0637   POTASSIUM mmol/L 4 7 4 7  --  4 3  < > 4 0  --   --    CHLORIDE mmol/L 104 104  --  104  < > 100  --   --    CO2 mmol/L 24 26  --  26  < > 30  --   --    CO2, I-STAT mmol/L  --   --  31  --   --   --  32 35*   BUN mg/dL 86* 87*  --  76*  < > 84*  --   --    CREATININE mg/dL 1 51* 1 75*  --  1 11  < > 1 57*  --   --    CALCIUM mg/dL 8 7 9 2  --  8 9  < > 8 5  --   --    ALK PHOS U/L 81  --   --   --   --  160*  --   --    ALT U/L 26  --   --   --   --  48  --   --    AST U/L 23  --   --   --   --  60*  --   --    GLUCOSE, ISTAT mg/dl  --   --  101  --   --   --  21* <20*   < > = values in this interval not displayed  Results from last 7 days  Lab Units 12/18/18  0641   INR  1 16       Imaging: I have personally reviewed pertinent reports      EKG/Telemtry:  Remains in atrial fibrillation    Scheduled Meds:    Current Facility-Administered Medications:  EPINEPHrine        lidocaine (PF)        acetaminophen 650 mg Oral Q6H PRN Lupe Navarro PA-C    albuterol 2 5 mg Nebulization Q6H PRN Lupe Navarro PA-C    ascorbic acid in sodium chloride 0 9% 100 mL IVPB 1,500 mg Intravenous Q6H Lupe Navarro PA-C cefepime 2,000 mg Intravenous Q12H Zabrina Wahiawa, PA-C Last Rate: 2,000 mg (12/21/18 1008)   chlorhexidine 15 mL Swish & Spit Q12H [address] KIKA Rai    dexmedetomidine (PRECEDEX) 200 mcg in 50 ml sodium chloride 0 9% 0 1-0 7 mcg/kg/hr Intravenous Titrated KIKA Rai Last Rate: 0 4 mcg/kg/hr (12/21/18 0523)   famotidine 20 mg Intravenous Q24H [address] Zabrina Wahiawa PA-C    fentaNYL 25 mcg/hr Intravenous Continuous Zabrina Wahiawa, PA-C Last Rate: 25 mcg/hr (12/21/18 3172)   fentanyl citrate (PF) 50 mcg Intravenous Q1H PRN KIKA Rai    heparin (porcine) 5,000 Units Subcutaneous [facility] Caitlin Cavazos PA-C    hydrocortisone sodium succinate 50 mg Intravenous Q6H KIKA Amezquita    insulin lispro 2-12 Units Subcutaneous Q6H [address] Caitlin Cavazos PA-C    ipratropium 0 5 mg Nebulization Q6H Theresa Lozano DO    levalbuterol 1 25 mg Nebulization Q6H Theresa Lozano,     metroNIDAZOLE 500 mg Intravenous Q8H Caitlin Cavazos PA-C Last Rate: 500 mg (12/21/18 1023)   midazolam        nicotine 7 mg Transdermal Daily Caitlin Cavazos PA-C    norepinephrine 1-30 mcg/min Intravenous Titrated Zabrina Wahiawa, PA-C Last Rate: 18 mcg/min (12/21/18 0545)   nystatin  Topical BID Zabrina Wahiawa, PA-C    pneumococcal 13-valent conjugate vaccine 0 5 mL Intramuscular Prior to discharge Zabrina Wahiawa, PA-C    senna-docusate sodium 1 tablet Oral BID KIKA Rai    thiamine 200 mg Intravenous Q12H Caitlin Cavazos PA-C Last Rate: 200 mg (12/21/18 1008)   vancomycin 12 5 mg/kg (Adjusted) Intravenous Q12H KIKA Magana    vasopressin (PITRESSIN) in 0 9 % sodium chloride 100 mL 0 04 Units/min Intravenous Continuous Zabrina Wahiawa, PA-C Last Rate: 0 04 Units/min (12/21/18 0603)     Continuous Infusions:    dexmedetomidine (PRECEDEX) 200 mcg in 50 ml sodium chloride 0 9% 0 1-0 7 mcg/kg/hr Last Rate: 0 4 mcg/kg/hr (12/21/18 9174)   fentaNYL 25 mcg/hr Last Rate: 25 mcg/hr (12/21/18 9913)   norepinephrine 1-30 mcg/min Last Rate: 18 mcg/min (12/21/18 0245)   vasopressin (PITRESSIN) in 0 9 % sodium chloride 100 mL 0 04 Units/min Last Rate: 0 04 Units/min (12/21/18 0603)       VTE Pharmacologic Prophylaxis: Heparin  VTE Mechanical Prophylaxis: sequential compression device      Counseling / Coordination of Care  Total time spent 20 minutes including teaching and family updates  More than 50% was spent counseling pt and family

## 2018-12-22 PROBLEM — T68.XXXA HYPOTHERMIA: Status: RESOLVED | Noted: 2018-01-01 | Resolved: 2018-01-01

## 2018-12-22 PROBLEM — I50.42 CHRONIC COMBINED SYSTOLIC AND DIASTOLIC CONGESTIVE HEART FAILURE (HCC): Chronic | Status: ACTIVE | Noted: 2018-01-01

## 2018-12-22 PROBLEM — F17.200 HEAVY TOBACCO SMOKER: Chronic | Status: ACTIVE | Noted: 2018-01-01

## 2018-12-22 PROBLEM — I87.2 VENOUS INSUFFICIENCY OF BOTH LOWER EXTREMITIES: Chronic | Status: ACTIVE | Noted: 2018-01-01

## 2018-12-22 PROBLEM — E11.9 TYPE 2 DIABETES MELLITUS WITHOUT COMPLICATION, WITHOUT LONG-TERM CURRENT USE OF INSULIN (HCC): Chronic | Status: ACTIVE | Noted: 2018-01-01

## 2018-12-22 PROBLEM — E16.2 HYPOGLYCEMIA: Status: RESOLVED | Noted: 2018-01-01 | Resolved: 2018-01-01

## 2018-12-22 NOTE — PROGRESS NOTES
Progress Note - Cardiology   Patricia Huang 80 y o  male MRN: [id number]  Unit/Bed#:  Encounter: [id number]  12/22/18  9:14 AM          Impression and Plan:    43-year-old with long-standing diabetes, hypertension, chronic and ongoing tobacco use, chronic lower extremity edema for which he was on loop diuretic-torsemide versus furosemide, has been experiencing increasing lower extremity edema and shortness of breath  Patient has always been reluctant to seek medical care      He was admitted having been found down by his wife with a blood glucose level less than 20, hypotensive at presentation, currently on 2 pressors      On further questioning, he did admit to increasing shortness of breath with exertion recently but no a typical anginal symptoms      He underwent an echocardiogram in the hospital that showed an ejection fraction of 45% and mild right ventricular dilatation with mild right ventricular dysfunction      On exam, he continues to have significant and chronic lower extremity and abdominal wall edema with eczematous changes-a attesting to its chronicity  Was initially on BiPAP, then needed intubated on 12/20/2018  All extremities are warm to exam   Has mild leukocytosis, proBNP of 8000 at admission, albumin of 2 5, troponin that was essentially negative      Plan:     Shock:   septic/vasodilatory shock based on his exam-in spite of being on pressors, is warm to touch, also with leukocytosis and hypothermia at presentation  Now also with fevers  His LV dysfunction does impair his cardiac reserve  Now intubated  No signs of acute hemodynamically significant PE on echocardiogram   Continue broad-spectrum antibiotics and evaluation for a source of infection  All cultures including bronchoscopy secretions show no growth so far  Acute respiratory failure:  Likely secondary to volume overload/pleural effusions and pulmonary edema, possibly infectious pulmonary process also     Volume removal will help - can put him on Lasix drip-at least at 10 mg an hour and up titrate to a goal of at least 2 L negative in the next 24 hours  Does have severe abdominal distention pushing up his diaphragm which also impairs his  pulmonary reserve  Received a total of 100 mg of IV Lasix day before   So far renal function has been stable     Lower extremity edema, dyspnea with exertion and elevated proBNP:  Likely chronic-newly diagnosed systolic and diastolic congestive heart failure  Eventual ischemic evaluation might be warranted  Hold off at this time  Does need large volume aggressive diuresis, once he is off pressors with stable hemodynamics     Acute kidney injury:  improved with fluids, creatinine of 1 2 now     Atrial fibrillation:   this is a new diagnosis, rates are controlled, IV heparin for anticoagulation can be started  Overall rates were acceptable even while in atrial fibrillation  Would be a poor candidate for Coumadin low, long-term  Renal function seems to be improving, would be a candidate for a NOAC if no procedures are scheduled and his clinical profile improves     Tobacco cessation long-term    Overall prognosis is poor       ===================================================================    Chief Complaint:   Chief Complaint   Patient presents with    Hypoglycemia - Symptomatic     Pt arrives via EMS with reports of Hypoglycemia, BS was 55 per EMS, given 25g of D10  EMS reports Pt was apneic with crakles at the bases  Pt BS in triage was <20, Provider aware           Subjective/Objective     Subjective:  Denies any symptoms although difficult to communicate due to being on BiPAP    Objective: Comfortable , no distress at the time of exam      Patient Active Problem List   Diagnosis    Type 2 diabetes mellitus without complication, without long-term current use of insulin (HCC)    Heavy tobacco smoker    Acute metabolic encephalopathy    Septic shock (HCC)    Paroxysmal atrial fibrillation (HCC)    Acute respiratory failure with hypoxia and hypercapnia (HCC)    Bandemia    Venous insufficiency of both lower extremities, suspected    CONG (acute kidney injury) POA    Chronic combined systolic and diastolic congestive heart failure (HCC)       Vitals: /66 (BP Location: Right arm)   Pulse 102   Temp (!) 102 5 °F (39 2 °C) (Probe)   Resp 18   Ht 5' 8" (1 727 m)   Wt 135 kg (298 lb 4 5 oz)   SpO2 95%   BMI 45 35 kg/m²     No intake/output data recorded  Wt Readings from Last 3 Encounters:   12/22/18 135 kg (298 lb 4 5 oz)       Intake/Output Summary (Last 24 hours) at 12/22/18 0914  Last data filed at 12/22/18 0600   Gross per 24 hour   Intake          2910 17 ml   Output             1465 ml   Net          1445 17 ml     I/O last 3 completed shifts: In: 4936 5 [I V :4086 5; IV Piggyback:850]  Out: 1971 [CSWWD:1361]    Invasive Devices     Central Venous Catheter Line            CVC Central Lines 12/18/18 Triple 16cm 3 days          Peripheral Intravenous Line            Peripheral IV 12/18/18 Left Forearm 4 days          Arterial Line            Arterial Line 12/18/18 Left Radial 3 days          Drain            Urethral Catheter Temperature probe 16 Fr  4 days    NG/OG/Enteral Tube Orogastric 16 Fr Center mouth 1 day          Airway            ETT  Hi-Lo; Cuffed 8 mm 1 day                  Physical Exam:  GEN: Jose Cooper appears ill, now intubated and sedated, , anasarca present  HEENT: pupils equal, round, and reactive to light; extraocular muscles intact  NECK: supple, no carotid bruits or JVD  HEART: irregular rhythm, normal S1 and S2, no murmur, no clicks, gallops or rubs   LUNGS: clear to auscultation bilaterally; no wheezes or rhonchi, no rales  ABDOMEN/GI: normal bowel sounds, soft, no tenderness, no distention  EXTREMITIES/Musculoskeltal: peripheral pulses normal; no clubbing, cyanosis, chronic lower extremity edema with erythema and sloughing  NEURO: no focal motor findings   SKIN: normal without suspicious lesions on exposed skin              Lab Results:     Results from last 7 days  Lab Units 12/18/18  2206 12/18/18  1914 12/18/18  1639   TROPONIN I ng/mL 0 04 0 04 0 05*       Results from last 7 days  Lab Units 12/22/18  0423 12/21/18  0340 12/20/18  2139 12/20/18  0328   WBC Thousand/uL 9 97 10 30*  --  8 41   HEMOGLOBIN g/dL 18 3* 15 9  --  16 4   I STAT HEMOGLOBIN g/dl  --   --  17 3*  --    HEMATOCRIT % 51 7* 45 8  --  47 0   HEMATOCRIT, ISTAT %  --   --  51*  --    PLATELETS Thousands/uL 187 192  --  186           Results from last 7 days  Lab Units 12/22/18  0423 12/21/18 0340 12/20/18 2246 12/20/18  2139  12/18/18  0641 12/18/18  0638 12/18/18  0637   POTASSIUM mmol/L 4 4 4 7 4 7  --   < > 4 0  --   --    CHLORIDE mmol/L 110* 104 104  --   < > 100  --   --    CO2 mmol/L 22 24 26  --   < > 30  --   --    CO2, I-STAT mmol/L  --   --   --  31  --   --  32 35*   BUN mg/dL 85* 86* 87*  --   < > 84*  --   --    CREATININE mg/dL 1 22 1 51* 1 75*  --   < > 1 57*  --   --    CALCIUM mg/dL 8 5 8 7 9 2  --   < > 8 5  --   --    ALK PHOS U/L 78 81  --   --   --  160*  --   --    ALT U/L 22 26  --   --   --  48  --   --    AST U/L 19 23  --   --   --  60*  --   --    GLUCOSE, ISTAT mg/dl  --   --   --  101  --   --  21* <20*   < > = values in this interval not displayed  Results from last 7 days  Lab Units 12/18/18  0641   INR  1 16       Imaging: I have personally reviewed pertinent reports      EKG/Telemtry:  Remains in atrial fibrillation    Scheduled Meds:    Current Facility-Administered Medications:  acetaminophen 650 mg Oral Q6H PRN Viviane Serra PA-C    albuterol 2 5 mg Nebulization Q6H PRN Viviane Serra PA-C    ascorbic acid in sodium chloride 0 9% 100 mL IVPB 1,500 mg Intravenous Q6H Viviane Serra PA-C Last Rate: 0 mg (12/21/18 2000)   cefepime 2,000 mg Intravenous Q12H KRYSTINA Lema-ZAC Last Rate: 2,000 mg (12/21/18 1955) chlorhexidine 15 mL Swish & Spit Q12H [address] KIKA Trent    dexmedetomidine (PRECEDEX) 200 mcg in 50 ml sodium chloride 0 9% 0 1-1 mcg/kg/hr Intravenous Titrated Ambika Gonzalez PA-C Last Rate: 1 mcg/kg/hr (12/22/18 8153)   fentaNYL 25 mcg/hr Intravenous Continuous Ambika Gonzalez PA-C Last Rate: Stopped (12/21/18 0800)   fentanyl citrate (PF) 50 mcg Intravenous Q1H PRN KIKA Trent    heparin (porcine) 5,000 Units Subcutaneous [facility] Caitlin Cavazos PA-C    hydrocortisone sodium succinate 50 mg Intravenous Q6H KIKA Amezquita    insulin lispro 2-12 Units Subcutaneous Q6H [address] Caitlin Cavazos PA-C    ipratropium 0 5 mg Nebulization Q6H Theresa Lozano DO    levalbuterol 1 25 mg Nebulization Q6H Theresa Lozano DO    metroNIDAZOLE 500 mg Intravenous Q8H Caitlin Cavazos PA-C Last Rate: 500 mg (12/22/18 0125)   nicotine 7 mg Transdermal Daily Caitlin Cavazos PA-C    norepinephrine 1-30 mcg/min Intravenous Titrated Ambika Gonzalez PA-C Last Rate: 13 mcg/min (12/22/18 0831)   nystatin  Topical BID Ambika Gonzalez PA-C    omeprazole (PRILOSEC) suspension 2 mg/mL 20 mg Oral Daily KIKA Magana    pneumococcal 13-valent conjugate vaccine 0 5 mL Intramuscular Prior to discharge Ambika Gonzalez PA-C    senna-docusate sodium 1 tablet Oral BID KIKA Trent    thiamine 200 mg Intravenous Q12H Caitlin Cavazos PA-C Last Rate: 200 mg (12/22/18 0914)   vancomycin 12 5 mg/kg (Adjusted) Intravenous Q12H KIKA Rollins Last Rate: 1,250 mg (12/22/18 0132)   vasopressin (PITRESSIN) in 0 9 % sodium chloride 100 mL 0 04 Units/min Intravenous Continuous Ambika Gonzalez PA-C Last Rate: 0 04 Units/min (12/21/18 1537)     Continuous Infusions:    dexmedetomidine (PRECEDEX) 200 mcg in 50 ml sodium chloride 0 9% 0 1-1 mcg/kg/hr Last Rate: 1 mcg/kg/hr (12/22/18 2802)   fentaNYL 25 mcg/hr Last Rate: Stopped (12/21/18 0800)   norepinephrine 1-30 mcg/min Last Rate: 13 mcg/min (12/22/18 0831)   vasopressin (PITRESSIN) in 0 9 % sodium chloride 100 mL 0 04 Units/min Last Rate: 0 04 Units/min (12/21/18 4957)       VTE Pharmacologic Prophylaxis: Heparin  VTE Mechanical Prophylaxis: sequential compression device      Counseling / Coordination of Care  Total time spent 20 minutes including teaching and family updates  More than 50% was spent counseling pt and family

## 2018-12-22 NOTE — CONSULTS
Consultation -Hadley Salguero [address] 80 y o  male MRN: [id number]  Unit/Bed#:  Encounter: [id number]        Inpatient consult to Acute Care Surgery  Consult performed by: Katie Cons ordered by: Chris Florez          Reason for Consult / Principal Problem: Septic shock (HCC)/pneumoperitoneum    HPI: Jose Cooper is a 80y o  year old male who presents with Septic shock (HCC)/pneumoperitoneum    Patient presently intubated on pressors  Much of the information comes from the critical care team as well as speaking to the patient's wife and daughter  He would occasionally see his family doctor  He has a longstanding history of fluid issues which we get diuretics  In generally try to avoid physicians  He was admitted with increasing shortness of breath and fluid issues  He has been to the critical care team who ultimately needed to intubated and supported with pressors  Echocardiogram revealed ejection fraction of 40-45%  He is being treated for pneumonia however continued to decline  This prompted a CT scan of chest abdomen pelvis today  This revealed extensive intra-abdominal fluid, pneumoperitoneum, cirrhotic changes of the liver, and anasarca  Wife states he has to be rather heavy drinker but stopped approximately 10 years ago when he was diagnosed with diabetes  He continues to have used tobacco   He has chronic draining wounds of his legs and his arms  Wife stated that he would bump is armband have trouble with hemostasis while at home  She also stated he has been using a fair amount of laxatives recently has been having trouble moving his bowels  Review of Systems    Historical Information   Past Medical History:   Diagnosis Date    Diabetes mellitus ([address] )     Type 2    Gout     Hypertension      History reviewed  No pertinent surgical history    Social History   History   Alcohol Use No     History   Drug Use No     History   Smoking Status    Current Every Day Smoker    Packs/day: 1 00   Smokeless Tobacco    Current User    Types: Chew     History reviewed  No pertinent family history      Meds/Allergies     Prescriptions Prior to Admission   Medication    allopurinol (ZYLOPRIM) 300 mg tablet    lisinopril (ZESTRIL) 10 mg tablet    metFORMIN (GLUCOPHAGE) 500 mg tablet     Current Facility-Administered Medications   Medication Dose Route Frequency    acetaminophen (TYLENOL) oral suspension 975 mg  975 mg Oral Once    acetaminophen (TYLENOL) tablet 650 mg  650 mg Oral Q6H PRN    albuterol inhalation solution 2 5 mg  2 5 mg Nebulization Q6H PRN    ascorbic acid 1,500 mg in sodium chloride 0 9 % 100 mL IVPB  1,500 mg Intravenous Q6H    cefepime (MAXIPIME) 2,000 mg in dextrose 5 % 50 mL IVPB  2,000 mg Intravenous Q12H    chlorhexidine (PERIDEX) 0 12 % oral rinse 15 mL  15 mL Swish & Spit Q12H [address]    dexmedetomidine (PRECEDEX) 200 mcg in sodium chloride 0 9 % 50 mL infusion  0 1-1 mcg/kg/hr Intravenous Titrated    fentanyl citrate (PF) 100 MCG/2ML 50 mcg  50 mcg Intravenous Q1H PRN    hydrocortisone sodium succinate (PF) (Solu-CORTEF) injection 50 mg  50 mg Intravenous Q6H    insulin lispro (HumaLOG) 100 units/mL subcutaneous injection 2-12 Units  2-12 Units Subcutaneous Q6H LEILANI    ipratropium (ATROVENT) 0 02 % inhalation solution 0 5 mg  0 5 mg Nebulization Q6H    levalbuterol (XOPENEX) inhalation solution 1 25 mg  1 25 mg Nebulization Q6H    metroNIDAZOLE (FLAGYL) IVPB (premix) 500 mg  500 mg Intravenous Q8H    nicotine (NICODERM CQ) 7 mg/24hr TD 24 hr patch 7 mg  7 mg Transdermal Daily    norepinephrine (LEVOPHED) 8 mg (DOUBLE CONCENTRATION) IV in sodium chloride 0 9% 250 mL  1-30 mcg/min Intravenous Titrated    nystatin (MYCOSTATIN) powder   Topical BID    omeprazole (PRILOSEC) suspension 2 mg/mL  20 mg Oral Daily    pneumococcal 13-valent conjugate vaccine (PREVNAR-13) IM injection 0 5 mL  0 5 mL Intramuscular Prior to discharge    senna-docusate sodium (SENOKOT S) 8 6-50 mg per tablet 1 tablet  1 tablet Oral BID    thiamine (VITAMIN B1) 200 mg in sodium chloride 0 9 % 50 mL IVPB  200 mg Intravenous Q12H    vancomycin (VANCOCIN) IVPB (premix) 1,000 mg  1,000 mg Intravenous Q12H    vasopressin (PITRESSIN) 20 Units in sodium chloride 0 9 % 100 mL infusion  0 04 Units/min Intravenous Continuous       No Known Allergies    Objective     Blood pressure 107/66, pulse 98, temperature (!) 102 1 °F (38 9 °C), temperature source Probe, resp  rate 14, height 5' 8" (1 727 m), weight 135 kg (298 lb 4 5 oz), SpO2 93 %  Intake/Output Summary (Last 24 hours) at 12/22/18 1451  Last data filed at 12/22/18 1200   Gross per 24 hour   Intake          2720 32 ml   Output             1715 ml   Net          1005 32 ml       PHYSICAL EXAM  General appearance:  Intubated unresponsive  He is morbidly obese upon evaluation  Lungs:  Decreased in the bases  Heart: regular rate and rhythm, S1, S2 normal, no murmur, click, rub or gallop  Abdomen: Morbidly obese  Thick shira appearing skin of the lower abdomen  Rectal: deferred  Skin:  Extensive edema of the extremities  He has weeping serous fluid from the lower and upper extremities  Extensive ecchymotic areas of the arms with some slight skin tears    Lab Results:   Admission on 12/18/2018   No results displayed because visit has over 200 results  Imaging Studies:     ASSESSMENT:    Pneumoperitoneum  Possible thickening of the sigmoid colon  Cirrhosis with elevated bilirubin of 2 5  Elevated INR 1 64  Extensive ascites  History of tobacco use  Pneumonia  Lower ejection fraction of 40-45%  Type 2 diabetes  Malnutrition with an albumin of 1 9    PLAN:  Lengthy discussion with the patient's wife and daughter at the bedside  Explained the etiology of the pneumoperitoneum is some type of a bowel perforation  If this were to be in the sigmoid colon this would require a colostomy    More importantly is underlying medical condition  He is very poorly deconditioned invariably with underlying cirrhosis with subsequent ascites and hypercoagulable state  Other systems involved would be respiratory, Cardiology as well as his endocrine system (diabetes)  Explained all this would make his overall recovery quite difficult  Would anticipate prolonged ventilation postoperatively  Suspect he would have difficulties in fluid electrolyte imbalances which could potentially even lead to renal replacement therapy    Given the above combination of factors with a bowel perforation, wife and daughter have agreed to NOT proceed with surgery as his quality of life postoperatively would be dramatically changed and not consistent with what he would want  Conversation was held in the presence of Dr Debbi Klinefelter with the critical care team   Family agreed to proceed with comfort care measures  All questions were answered prior to me leaving the room  Counseling / Coordination of Care  Total time spent today  1 hour  Greater than 50% of total time was spent with the patient and / or family counseling and / or coordination of care

## 2018-12-22 NOTE — PROGRESS NOTES
Appreciate surgery input  I met with the patient's wife, daughter and Dr Vipul Ram  Given his underlying comorbidities and severity of illness, surgical intervention would be extremely high risk and likely result in difficulty in wound healing and prolonged need for aggressive support and care  The family very clearly states that he would not want to undergo aggressive care unless he could return to his previous level of functioning  They further state that he would not want to be maintained on artificial life support for any prolonged period of time if there was no hope of meaningful recovery  With this in mind, they have elected not to pursue surgery and transition to comfort care approach  We will initiate fentanyl infusion and wean off Precedex  Once additional family members have arrived, we will also wean off vasopressors and proceed with extubation

## 2018-12-22 NOTE — RESPIRATORY THERAPY NOTE
12/22/18 0359   Vent Information   Vent    Vent type     Vent Mode AC/VC   $ Vent Daily Charge-Subsequent Yes   $ Pulse Oximetry Spot Check Charge Completed   SpO2 97 %   AC/VC Settings   Resp Rate (BPM) 22 BPM   Vt (mL) 450 mL   FIO2 (%) 50 %   PEEP (cmH2O) 8 cmH2O   Flow (LPM) 70 L/min   Trigger Sensitivity Flow (lpm) 3 %   Trigger Sensitivity Pressure (cm H2O)  3 %   Humidification Heater   Heater Temperature (Set) 98 6 °F (37 °C)   AC/VC Actuals   Resp Rate (BPM) 26 BPM   VT (mL) 450   MV 12   MAP (cmH2O) 15 cmH2O   Peak Pressure (cmH2O) 19 cmH2O   I/E Ratio (Obs) 1/3   Heater Temperature (Obs) 98 6 °F (37 °C)   Static Compliance (mL/cmH20) 47 mL/cmH2O   Plateau Pressure (cm H2O) 21 cm H2O   AC/VC Alarms   High Peak Pressure (cmH2O) 40   High Resp Rate (BPM) 40 BPM   MV High (L/min) 18 L/min   MV Low (L/min) 3 L/min   Vt High (mL) 1000 mL   Vt Low (mL) 350 mL   AC/VC Apnea Settings   Resp Rate (BPM) 22 BPM   VT (mL) 450 mL   FIO2 (%) 100 %   Apnea Time (s) 20 S   Apnea Flow (L/min) 70 L/min   Maintenance   Alarm (pink) cable attached Yes   Resuscitation bag with peep valve at bedside Yes   Water bag changed No   Circuit changed No   Daily Screen   Patient safety screen outcome: Failed   IHI Ventilator Associated Pneumonia Bundle   Daily Assessment of Readiness to Extubate Not applicable (Comment)   Head of Bed Elevated HOB 30   Oral Care Mouth suctioned   ETT  Hi-Lo; Cuffed 8 mm   Placement Date/Time: 12/20/18 2205   Mask Ventilation: Ventilated by mask (1)  Preoxygenated: Yes  Technique: Direct laryngoscopy  Type: Hi-Lo; Cuffed  Tube Size: 8 mm  Laryngoscope: Mac  Blade Size: 4  Location: Oral  Insertion: Atraumatic  Insertion        Secured at (cm) 22   Measured from [address] by [employer] tube grimm   Site Condition Dry   Cuff Pressure (cm H2O) 30 cm H2O   HI-LO Suction  Intermittent suction   HI-LO Secretions Scant;Small   HI-LO Intervention Patent   RT Ventilator Management Note  Louisa Barfield 80 y o  male MRN: [id number]  Unit/Bed#:  Encounter: [id number]      Daily Screen       12/22/2018 0042 12/22/2018 0359          Patient safety screen outcome[de-identified] Failed Failed              Physical Exam:   Assessment Type: Assess only  General Appearance: Sedated  Respiratory Pattern: Assisted  Chest Assessment: Chest expansion symmetrical  Bilateral Breath Sounds: Coarse  Cough: Non-productive      Resp Comments: pt tolerated current settings throughout the night   Rt will continue to monitor

## 2018-12-22 NOTE — PROGRESS NOTES
Progress Note - Critical Care   Jose Cooper 80 y o  male MRN: [id number]  Unit/Bed#:  Encounter: [id number]    Attending Physician: Marilia Marshall, DO      ______________________________________________________________________  Assessment and Plan:   Principal Problem:    Septic shock ([facility] )  Active Problems:    Acute respiratory failure with hypoxia and hypercapnia (HCC)    Acute metabolic encephalopathy    CONG (acute kidney injury) POA    Chronic combined systolic and diastolic congestive heart failure (HCC)    Paroxysmal atrial fibrillation ([facility] )    Bandemia    Type 2 diabetes mellitus without complication, without long-term current use of insulin (HCC)    Heavy tobacco smoker    Venous insufficiency of both lower extremities, suspected  Resolved Problems:    Hypoglycemia    Hypothermia    Elevated troponin    Neuro:  Encephalopathy toxic metabolic  · Continue Precedex drip  · Goal RASS 0 to -1 currently negative for however has been receiving p r n  Medications  · P r n  Fentanyl 6 doses in the last 24 hr, p r n   Versed 2 doses in last 24 hr  · Discontinue Versed  · If patient continues to need p r n  will start fentanyl drip  · Neuro checks, CAM ICU, sleep hygiene, delirium precautions    CV:  Septic shock/acute on chronic combined CHF/new onset atrial fibrillation/history of hypertension  · BNP 8000 on admission, Echo with an EF of 59-47% grade 1 diastolic dysfunction RV mildly reduced function moderate TR PA pressure 35  · Continue Levophed/vasopressin/stress dose steroids/Marik protocol, triple-lumen day 5, maintain a line  · Cardiology following  · Check CVP, SVO2  · Discontinue flow track is is patient is in atrial fibrillation  · Chads 2 Vasc score of 4, Cardiology note recommends heparin drip however patient is on subcu heparin will start heparin drip  · Positive 9 L, weight up 6 kg-Patient appears volume overloaded will discuss with Cardiology diuresis consider Lasix drip given poor response in the prior few days    Pulm:  Acute hypoxic/hypercapnic respiratory failure/pneumonia/tobacco abuse   · Status post bronchoscopy 12/21 follow up with cultures preliminary no bacteria  · Maintain mechanical ventilation AC 22/4 50/50%/8  · Wean peep and FiO2 for oxygen saturations greater than 92%  · Antibiotics broadened 12/21 to cefepime Flagyl/vancomycin  · Nicotine patch  · Trend procalcitonin  · ET tube day 3    GI:  Abdominal distension/elevated T bili  · Check abdominal ultrasound consider paracentesis ascites fluid  · Elevated T bili in the setting of sepsis CMP in the a m  · Changed to omeprazole for PPI  · Jevity 1 5 at 60 cc/hour with 150 mL of free water q 4 hours for nutrition    : A KI versus CKD  · Unknown baseline creatinine  · Creatinine improving  · UA with 1-2 cast in moderate blood  · Maintain Henson catheter, I/O    F/E/N:  No issues    ID:  Septic shock   · Blood cultures no growth, urine Legionella strep negative, flu negative, bronch cultures preliminary no bacteria, MRSA pending  · Repeat blood cultures given persistent temperature and check lactate  · Continue cefepime/Flagyl/vancomycin day 2, antibiotic day 5  · Brice protocol with vitamin C/thiamine  · Procalcitonin trend 0 5-1- 3    Heme:  No issues    Endo:  Dm 2  · Holding home metformin  · Continue sliding scale insulin     Msk/Skin:  PAD  · Lower extremity duplex pending  · Consider arterial duplex and podiatry consult  · Wound Care consulted    Disposition:  Maintain medical critical care, consider diuresis check CVP/SV O2, follow up with bronch cultures    Code Status: Level 1 - Full Code    Counseling / Coordination of Care  Total Critical Care time spent 50 minutes excluding procedures, teaching and family updates      ______________________________________________________________________    Chief Complaint:  Intubated/sedated    24 Hour Events:   Bronchoscopy performed yesterday with occlusive secretions noted in the left lower lobe and lingula    Febrile overnight despite Tylenol x2/Motrin started on cooling blanket    Review of Systems   Unable to perform ROS: Intubated     ______________________________________________________________________    Physical Exam:   Physical Exam   Constitutional: He appears ill  He is sedated, intubated and restrained  HENT:   Head: Normocephalic and atraumatic  Mouth/Throat: Oropharynx is clear and moist    Eyes: Pupils are equal, round, and reactive to light  No scleral icterus  Neck: Neck supple  JVD present  Cardiovascular: Normal heart sounds  An irregular rhythm present  Tachycardia present  Exam reveals no gallop and no friction rub  No murmur heard  DP non doppler, PT by doppler   Pulmonary/Chest: Effort normal and breath sounds normal  He is intubated  No respiratory distress  He has no wheezes  He has no rales  Abdominal: He exhibits distension  Firm, typmanic   Genitourinary:   Genitourinary Comments: angelica   Neurological: GCS eye subscore is 1  GCS verbal subscore is 1  GCS motor subscore is 1  Skin:   LE curlex wrap toes, with delayed refill         ______________________________________________________________________  Vitals:    18 0505 18 0558 18 0605 18 0700   BP: 122/65  124/65 107/66   BP Location:    Right arm   Pulse: 104  99 102   Resp:  18   Temp:    (!) 102 5 °F (39 2 °C)   TempSrc:    Probe   SpO2: (!) 88%  94% 94%   Weight:  135 kg (298 lb 4 5 oz)     Height:           Temperature:   Temp (24hrs), Av 1 °F (38 4 °C), Min:100 4 °F (38 °C), Max:102 5 °F (39 2 °C)    Current Temperature: (!) 102 5 °F (39 2 °C)  Weights:   IBW: 68 4 kg    Body mass index is 45 35 kg/m²    Weight (last 2 days)     Date/Time   Weight    18 0558  135 (298 28)    18 0600  131 (289 68)    18 0600  133 (293 21)    18 0526  133 (293 43)            Hemodynamic Monitoring:  N/A  CO:  [2 1 L/min] 2 1 L/min  Non-Invasive/Invasive Ventilation Settings:  Respiratory    Lab Data (Last 4 hours)    None         O2/Vent Data (Last 4 hours)      12/22 0359           Vent Mode AC/VC       Resp Rate (BPM) (BPM) 22       Vt (mL) (mL) 450       FIO2 (%) (%) 50       PEEP (cmH2O) (cmH2O) 8       Patient safety screen outcome: Failed       MV 12                 Lab Results   Component Value Date    PHART 7 382 12/21/2018    PQN5LVY 28 3 (LL) 12/21/2018    PO2ART 70 1 (L) 12/21/2018    AJP4GZN 16 4 (L) 12/21/2018    BEART -7 2 12/21/2018    SOURCE Line, Arterial 12/21/2018     SpO2: SpO2: 94 %  Intake and Outputs:  I/O       12/20 0701 - 12/21 0700 12/21 0701 - 12/22 0700 12/22 0701 - 12/23 0700    I V  (mL/kg) 1955 4 (14 9) 2234 5 (16 6)     IV Piggyback 500 850     Total Intake(mL/kg) 2455 4 (18 7) 3084 5 (22 8)     Urine (mL/kg/hr) 576 (0 2) 1480 (0 5)     Emesis/NG output 0      Total Output 576 1480      Net +1879 4 +1604 5                   Nutrition:        Diet Orders            Start     Ordered    12/21/18 0701  Room Service  Once     Question:  Type of Service  Answer:  Room Service - Appropriate with Assistance    12/21/18 0700    12/21/18 0048  Diet NPO; Sips with meds  Diet effective now     Question Answer Comment   Diet Type NPO    NPO Except: Sips with meds    RD to adjust diet per protocol?  No        12/21/18 0047          Labs:     Results from last 7 days  Lab Units 12/22/18  0423 12/21/18  0340 12/20/18  2139 12/20/18  0328 12/19/18  0327 12/18/18  0641 12/18/18  0638   WBC Thousand/uL 9 97 10 30*  --  8 41 11 63* 4 68  --    HEMOGLOBIN g/dL 18 3* 15 9  --  16 4 16 4 17 3*  --    I STAT HEMOGLOBIN g/dl  --   --  17 3*  --   --   --  18 4*   HEMATOCRIT % 51 7* 45 8  --  47 0 46 5 48 1  --    HEMATOCRIT, ISTAT %  --   --  51*  --   --   --  54*   PLATELETS Thousands/uL 187 192  --  186 222 211  --    NEUTROS PCT %  --   --   --   --   --  81*  --    BANDS PCT % 8 8  --  10* 11*  --   --    MONOS PCT % --   --   --   --   --  7  --    MONO PCT % 3* 2*  --  3* 4  --   --        Results from last 7 days  Lab Units 12/22/18 0423 12/21/18 0340 12/20/18 2246 12/20/18 2139 12/20/18 0328 12/19/18 0327 12/18/18  0641  12/18/18  0638   SODIUM mmol/L 143 140 141  --  139 138 140  --   --    POTASSIUM mmol/L 4 4 4 7 4 7  --  4 3 4 4 4 0  < >  --    CHLORIDE mmol/L 110* 104 104  --  104 102 100  --   --    CO2 mmol/L 22 24 26  --  26 25 30  --   --    CO2, I-STAT mmol/L  --   --   --  31  --   --   --   --  32   AGAP mmol/L  --   --   --   --   --   --   --   --  13   ANION GAP mmol/L 11 12 11  --  9 11 10  --   --    BUN mg/dL 85* 86* 87*  --  76* 77* 84*  --   --    CREATININE mg/dL 1 22 1 51* 1 75*  --  1 11 1 26 1 57*  --   --    CALCIUM mg/dL 8 5 8 7 9 2  --  8 9 8 4 8 5  --   --    ALT U/L 22 26  --   --   --   --  48  --   --    AST U/L 19 23  --   --   --   --  60*  --   --    ALK PHOS U/L 78 81  --   --   --   --  160*  --   --    ALBUMIN g/dL 1 9* 2 3*  --   --   --   --  2 7*  --   --    TOTAL BILIRUBIN mg/dL 2 50* 1 60*  --   --   --   --  0 80  --   --    < > = values in this interval not displayed      Results from last 7 days  Lab Units 12/22/18 0423 12/21/18 0340 12/20/18 0328 12/19/18  0327   MAGNESIUM mg/dL 2 2 2 5 2 3 2 3   PHOSPHORUS mg/dL 3 7 4 7*  --  4 5*        Results from last 7 days  Lab Units 12/18/18  0641   INR  1 16   PTT seconds 31       Results from last 7 days  Lab Units 12/18/18  2206 12/18/18  1914 12/18/18  1639 12/18/18  0641   TROPONIN I ng/mL 0 04 0 04 0 05* 0 04       Results from last 7 days  Lab Units 12/21/18  0340 12/18/18  0641   LACTIC ACID mmol/L 1 2 1 7     ABG:  Lab Results   Component Value Date    PHART 7 382 12/21/2018    UQT5QOL 28 3 (LL) 12/21/2018    PO2ART 70 1 (L) 12/21/2018    LHA2SMB 16 4 (L) 12/21/2018    BEART -7 2 12/21/2018    SOURCE Line, Arterial 12/21/2018     VBG:    Results from last 7 days  Lab Units 12/21/18  1605 12/21/18  1604   PH GORDY  7 339  -- PCO2 GORDY mm Hg 47 2  --    PO2 GORDY mm Hg 41 4  --    HCO3 GORDY mmol/L 24 8  --    BASE EXC GORDY mmol/L -1 5  --    ABG SOURCE   --  Line, Arterial       Results from last 7 days  Lab Units 12/21/18  0340 12/20/18  1107 12/19/18  0327   PROCALCITONIN ng/ml 3 61* 1 85* 0 50*     No results found for: [facility]   Imaging:  I have personally reviewed pertinent reports  and I have personally reviewed pertinent films in PACS  EKG:  Tele reviewed  Micro:    Results from last 7 days  Lab Units 12/21/18  1132 12/20/18  2108 12/19/18  1254 12/18/18  0706   BLOOD CULTURE   --   --   --  No Growth at 72 hrs  No Growth at 72 hrs     GRAM STAIN RESULT  Rare Polys  No bacteria seen  --   --   --    INFLUENZA B PCR   --  None Detected  --   --    RSV PCR   --  None Detected  --   --    LEGIONELLA URINARY ANTIGEN   --   --  Negative  --    STREP PNEUMONIAE ANTIGEN, URINE   --   --  Negative  --      Allergies: No Known Allergies  Medications:   Scheduled Meds:    Current Facility-Administered Medications:  acetaminophen 650 mg Oral Q6H PRN KRYSTINA Lane-C    albuterol 2 5 mg Nebulization Q6H PRN KRYSTINA Lane-ZAC    ascorbic acid in sodium chloride 0 9% 100 mL IVPB 1,500 mg Intravenous Q6H Carmine Major PA-C Last Rate: 0 mg (12/21/18 2000)   cefepime 2,000 mg Intravenous Q12H Carmine Major PA-C Last Rate: 2,000 mg (12/21/18 1955)   chlorhexidine 15 mL Swish [employer] Spit Q12H [address] KIKA Amezquita    dexmedetomidine (PRECEDEX) 200 mcg in 50 ml sodium chloride 0 9% 0 1-1 mcg/kg/hr Intravenous Titrated KRYSTINA Lane-ZAC Last Rate: 1 mcg/kg/hr (12/22/18 4410)   fentaNYL 25 mcg/hr Intravenous Continuous Carmine Major PA-C Last Rate: Stopped (12/21/18 0800)   fentanyl citrate (PF) 50 mcg Intravenous Q1H PRN KIKA Mendes    heparin (porcine) 5,000 Units Subcutaneous Q8H [address] Caitlin Cavazos PA-C    hydrocortisone sodium succinate 50 mg Intravenous Q6H Mil Thomason, KIKA    insulin lispro 2-12 Units Subcutaneous Q6H [address] Caitlin Cavazos PA-C    ipratropium 0 5 mg Nebulization Q6H Theresa Lozano DO    levalbuterol 1 25 mg Nebulization Q6H Theresa Lozano DO    metroNIDAZOLE 500 mg Intravenous Q8H Caitlin Cavazos PA-C Last Rate: 500 mg (12/22/18 0125)   nicotine 7 mg Transdermal Daily Raj Birch PA-C    norepinephrine 1-30 mcg/min Intravenous Titrated Raj Birch PA-C Last Rate: 14 mcg/min (12/22/18 0719)   nystatin  Topical BID Raj Birch PA-C    omeprazole (PRILOSEC) suspension 2 mg/mL 20 mg Oral Daily KIKA Magana    pneumococcal 13-valent conjugate vaccine 0 5 mL Intramuscular Prior to discharge Raj Birch PA-C    senna-docusate sodium 1 tablet Oral BID KIKA Salgado    thiamine 200 mg Intravenous Q12H Raj Birch PA-C Last Rate: Stopped (12/21/18 2000)   vancomycin 12 5 mg/kg (Adjusted) Intravenous Q12H KIKA Garcia Last Rate: 1,250 mg (12/22/18 0132)   vasopressin (PITRESSIN) in 0 9 % sodium chloride 100 mL 0 04 Units/min Intravenous Continuous Raj Birch PA-C Last Rate: 0 04 Units/min (12/21/18 1537)     Continuous Infusions:    dexmedetomidine (PRECEDEX) 200 mcg in 50 ml sodium chloride 0 9% 0 1-1 mcg/kg/hr Last Rate: 1 mcg/kg/hr (12/22/18 0655)   fentaNYL 25 mcg/hr Last Rate: Stopped (12/21/18 0800)   norepinephrine 1-30 mcg/min Last Rate: 14 mcg/min (12/22/18 0719)   vasopressin (PITRESSIN) in 0 9 % sodium chloride 100 mL 0 04 Units/min Last Rate: 0 04 Units/min (12/21/18 1537)     PRN Meds:    acetaminophen 650 mg Q6H PRN   albuterol 2 5 mg Q6H PRN   fentanyl citrate (PF) 50 mcg Q1H PRN   pneumococcal 13-valent conjugate vaccine 0 5 mL Prior to discharge     VTE Pharmacologic Prophylaxis: Heparin  VTE Mechanical Prophylaxis: sequential compression device  Invasive lines and devices:   Invasive Devices     Central Venous Catheter Line            CVC Central Lines 12/18/18 Triple 16cm 3 days          Peripheral Intravenous Line            Peripheral IV 12/18/18 Left Forearm 4 days          Arterial Line            Arterial Line 12/18/18 Left Radial 3 days          Drain            Urethral Catheter Temperature probe 16 Fr  3 days    NG/OG/Enteral Tube Orogastric 16 Fr Center mouth 1 day          Airway            ETT  Hi-Lo; Cuffed 8 mm 1 day                     Portions of the record may have been created with voice recognition software  Occasional wrong word or "sound a like" substitutions may have occurred due to the inherent limitations of voice recognition software  Read the chart carefully and recognize, using context, where substitutions have occurred      KIKA Jean

## 2018-12-23 LAB
BACTERIA BLD CULT: NORMAL
BACTERIA BLD CULT: NORMAL
BACTERIA BRONCH AEROBE CULT: ABNORMAL
GRAM STN SPEC: ABNORMAL
GRAM STN SPEC: ABNORMAL

## 2018-12-23 PROCEDURE — 93970 EXTREMITY STUDY: CPT | Performed by: SURGERY

## 2018-12-23 NOTE — PROGRESS NOTES
Vancomycin Assessment    Anayeli Langston is a 80 y o  male who is currently receiving vancomycin 20mg/kg (ABW) loading, followed by 12 5mg/kg (ABW) Q12hr  for Pneumonia     Relevant clinical data and objective history reviewed:  Creatinine   Date Value Ref Range Status   12/22/2018 1 20 0 60 - 1 30 mg/dL Final     Comment:     Standardized to IDMS reference method   12/22/2018 1 22 0 60 - 1 30 mg/dL Final     Comment:     Standardized to IDMS reference method   12/21/2018 1 51 (H) 0 60 - 1 30 mg/dL Final     Comment:     Standardized to IDMS reference method     BP 91/57 (BP Location: Right arm)   Pulse 105   Temp 99 1 °F (37 3 °C)   Resp 17   Ht 5' 8" (1 727 m)   Wt 135 kg (298 lb 4 5 oz)   SpO2 94%   BMI 45 35 kg/m²   I/O last 3 completed shifts: In: 4097 9 [I V :3177 9; NG/GT:20; IV Piggyback:900]  Out: 2580 [Urine:2580]  Lab Results   Component Value Date/Time    BUN 90 (H) 12/22/2018 10:14 AM    WBC 10 34 (H) 12/22/2018 10:14 AM    HGB 18 1 (H) 12/22/2018 10:14 AM    HCT 50 4 (H) 12/22/2018 10:14 AM    MCV 91 12/22/2018 10:14 AM     12/22/2018 10:14 AM     Temp Readings from Last 3 Encounters:   12/22/18 99 1 °F (37 3 °C)     Vancomycin Days of Therapy: 2    Assessment/Plan  The patient is currently on vancomycin utilizing scheduled dosing  Baseline risks associated with therapy include: pre-existing renal impairment, concomitant nephrotoxic medications and advanced age  The patient is receiving 20mg/kg (ABW) loading, followed by 12 5mg/kg (ABW) Q12hr  with the most recent vancomycin level being not at steady-state and supratherapeutic based on a goal of 15-20 (appropriate for most indications) ; therefore, after clinical evaluation will be changed to 1000 mg IV q12h   Pharmacy will continue to follow closely for s/sx of nephrotoxicity, infusion reactions and appropriateness of therapy  BMP and CBC will be ordered per protocol    Plan for trough as patient approaches steady state, prior to the 4th  dose at approximately  0530 on 12/24  Pharmacy will continue to follow the patients culture results and clinical progress daily      Claudell Sheldon, Pharmacist

## 2018-12-23 NOTE — PROGRESS NOTES
All family at bedside, the patient was visited by the  this evening and received last rites and the sacrament of the [address]  Wife at this time asked that we proceed with transitioning to comfort measures only  At this time we will turn off all therapies that do not focus on comfort and terminally extubate  Fentanyl infusion continues, fentanyl and Ativan p r n     Family at bedside, all questions answered to their satisfaction, emotional support given

## 2018-12-23 NOTE — DEATH NOTE
INPATIENT DEATH NOTE  Anant Mendieta 80 y o  male MRN: [id number]  Unit/Bed#:  Encounter: [id number]    Date, Time and Cause of Death    Date of Death:  18  Time of Death:   9:59 PM  Preliminary Cause of Death:  Septic shock ([facility] )  Entered by:  Ana Elaine[RS1 1]     Attribution     RS1 1 KIKA Ewing 18 22:12           Patient's Information  Pronounced by: Jodi Zhou  Did the patient's death occur in the ED?: No  Did the patient's death occur in the OR?: No  Did the patient's death occur less than 10 days post-op?: No  Did the patient's death occur within 24 hours of admission?: No  Was code status DNR at the time of death?: Yes    PHYSICAL EXAM:  Unresponsive to noxious stimuli, Spontaneous respirations absent, Breath sounds absent and Heart sounds absent    Medical Examiner notification criteria:  NONE APPLICABLE   Medical Examiner's office notified?:  No, does not meet ME notification criteria   Medical Examiner accepted case?:  No  Name of Medical Examiner:     Family Notification  Was the family notified?: Yes (Family and wife at bedside and notified of death)  Date Notified: 18  Time Notified:   Notified by: Gee Banks  Name of Family Notified of Death: Pauly Carranza (spouse)   Relationship to Patient: Spouse  Family Notification Route:  At bedside  Was the family told to contact a  home?: Yes  Name of [facility][de-identified] They don't have one yet    Autopsy Options:  Post-mortem examination declined by next of kin    Primary Service Attending Physician notified?:  yes - Attending:  Emily Cleveland, DO    Physician/Resident responsible for completing Discharge Summary:  Jodi Zhou

## 2018-12-23 NOTE — DISCHARGE SUMMARY
Discharge Summary - Manuel Moore 80 y o  male MRN: [id number]    Unit/Bed#:  Encounter: [id number] PCP: No primary care provider on file  Admission Date:   Admission Orders     Ordered        12/18/18 0715  Inpatient Admission (expected length of stay for this patient is greater than two midnights)  Once               Admitting Diagnosis: Hypoglycemia [E16 2]  Hypotension [I95 9]  New onset atrial fibrillation ([facility] [facility] ) [I48 91]  Bilateral pleural effusion [J90]  Lower extremity cellulitis [L37 883]  Pressure ulcer [L89 90]  Hypothermia, initial encounter [T68  XXXA]  Severe sepsis ([facility] [address] ) [A41 9, R65 20]    HPI: (From initial H&P by CELINA Yañez CNCAROL) 80 y o  male with a past medical history of diabetes, hypertension, suspect heart failure due to diuretic home use, and tobacco abuse who presents to the emergency room via EMS  Per patient's wife, patient started with shortness of breath that has been worsening over the past few weeks  Patient has always had a cough but wife noticed increased sputum production  The swelling in his legs had been increased  During this time, per his wife, he had called his PCP and had his dose of torsemide increased  This morning, wife found patient unresponsive and called 911  He was found to be hypoglycemic and received 25g of D10  He was also hypoxic in the 70s which improved to the 90s with albuterol treatment and face mask      In the ED, vital signs 91F, 86, 20, 77/43, 91% on NC  He received 3L of NSS and started on peripheral Levophed  Received ceftriaxone 1 gm and vancomycin 2g  Blood glucose was < 20 in which he received 4 amps of D50 and started on D10 IVF at 100 ml/hr  Due to his work of breathing, he was placed on BiPAP  Bear hugger applied      Arrived to the ICU on BiPAP  Central line was placed and confirmed by CXR       Procedures Performed:   Orders Placed This Encounter   Procedures    Bronchoscopy    Central Line    ED ECG Documentation Only    Intubation Summary of Hospital Course:  Patient was admitted to ICU with septic shock felt to be secondary to pneumonia  He was started on empiric antibiotics after cultures were collected, he required vasopressors during his admission, he also required mechanical ventilation  All cultures this admission have been negative, the bronchoscopy cultures from 18 were still pending at time of this note  In his shock state and in the setting of new onset AFib, echocardiogram was obtained which showed an LVEF of 40-45%  On 18 patient had a CT scan of the abdomen that showed a pneumoperitoneum and a large amount of ascites with bilateral pleural effusions  He was evaluated by surgery for possible intervention  A discussion was had with family about patient's current status and possible surgical option, per the discussion that was had the family decided that the patient would best be served if instead of continued aggressive measures they transitioned the focus of care to comfort measures only  Per the family's request a  came in this evening and saw the patient, multiple family members and friends came and visited the patient  On the evening of 2018 the patient was made a level 4 code status, comfort measures only and was terminally extubated  Patient  at 21:59 on 2018 with family at bedside  At this time the family is unsure which  home they would like to use but will call when they decide  Significant Findings, Care, Treatment and Services Provided:   Consults:  General surgery, wound care, Cardiology  18 - Intubated  18 CXR: Patchy bilateral airspace opacities suspicious for pulmonary edema versus multifocal pneumonia  Bilateral pleural effusions and cardiomegaly  18 Echo:  EF 40-45%  Septal dyskinesis  Grade 1 DD  RV dilated with mildly reduced function  Left atrium moderately dilated  Mild AS  Mod TR   Peak PA pressure 35    18 BCXx2: No growth 18: Right IJ TLC   18 bronchoscopy  18 bronch preliminary no bacteria  18 lower extremity duplex:  Results pending at time note  18 CT of the chest abdomen and pelvis-large amount of ascites with moderate right effusion and small left effusion, pneumoperitoneum etiology unclear, anasarca, cirrhosis, atelectasis    Complications: None    Disposition:      Final Diagnosis:   Principal Problem:    Septic shock ([facility] )  Active Problems:    Acute respiratory failure with hypoxia and hypercapnia (HCC)    Acute metabolic encephalopathy    CONG (acute kidney injury) POA    Chronic combined systolic and diastolic congestive heart failure (HCC)    Paroxysmal atrial fibrillation ([facility] )    Bandemia    Type 2 diabetes mellitus without complication, without long-term current use of insulin ([facility] )    Heavy tobacco smoker    Venous insufficiency of both lower extremities, suspected      Resolved Problems  Date Reviewed: 2018          Resolved    Hypoglycemia 2018     Resolved by  KIKA Garcia    Hypothermia 2018     Resolved by  KIKA Garcia    Elevated troponin 2018     Resolved by  Sol Aden          Condition at Time of Death: Comfort Care Status    Date, Time and Cause of Death    Date of Death:  18  Time of Death:   9:59 PM  Preliminary Cause of Death:  Septic shock ([facility] )  Entered by:  Terrell Elaine[RS1 1]     Attribution     RS1 1 Rockne Bosworth, CRNP 18 22:12

## 2018-12-26 LAB
FUNGUS SPEC CULT: ABNORMAL
P JIROVECII AG SPEC QL IF: NORMAL

## 2018-12-27 LAB
BACTERIA BLD CULT: NORMAL
BACTERIA BLD CULT: NORMAL

## 2019-01-08 LAB — LEGIONELLA SPEC CULT: NORMAL
